# Patient Record
Sex: FEMALE | Race: WHITE
[De-identification: names, ages, dates, MRNs, and addresses within clinical notes are randomized per-mention and may not be internally consistent; named-entity substitution may affect disease eponyms.]

---

## 2019-10-08 ENCOUNTER — HOSPITAL ENCOUNTER (OUTPATIENT)
Dept: HOSPITAL 76 - LAB.WCP | Age: 64
Discharge: HOME | End: 2019-10-08
Attending: PHYSICIAN ASSISTANT
Payer: COMMERCIAL

## 2019-10-08 DIAGNOSIS — E03.9: ICD-10-CM

## 2019-10-08 DIAGNOSIS — E78.5: ICD-10-CM

## 2019-10-08 DIAGNOSIS — E11.9: ICD-10-CM

## 2019-10-08 DIAGNOSIS — I48.91: Primary | ICD-10-CM

## 2019-10-08 LAB
ALBUMIN DIAFP-MCNC: 4 G/DL (ref 3.2–5.5)
ALBUMIN/GLOB SERPL: 1.1 {RATIO} (ref 1–2.2)
ALP SERPL-CCNC: 64 IU/L (ref 42–121)
ALT SERPL W P-5'-P-CCNC: 15 IU/L (ref 10–60)
ANION GAP SERPL CALCULATED.4IONS-SCNC: 10 MMOL/L (ref 6–13)
AST SERPL W P-5'-P-CCNC: 20 IU/L (ref 10–42)
BASOPHILS NFR BLD AUTO: 0.1 10^3/UL (ref 0–0.1)
BASOPHILS NFR BLD AUTO: 1.2 %
BILIRUB BLD-MCNC: 1.1 MG/DL (ref 0.2–1)
BUN SERPL-MCNC: 29 MG/DL (ref 6–20)
CALCIUM UR-MCNC: 10.9 MG/DL (ref 8.5–10.3)
CHLORIDE SERPL-SCNC: 99 MMOL/L (ref 101–111)
CHOLEST SERPL-MCNC: 216 MG/DL
CO2 SERPL-SCNC: 29 MMOL/L (ref 21–32)
CREAT SERPLBLD-SCNC: 1.6 MG/DL (ref 0.4–1)
EOSINOPHIL # BLD AUTO: 0.2 10^3/UL (ref 0–0.7)
EOSINOPHIL NFR BLD AUTO: 2.8 %
ERYTHROCYTE [DISTWIDTH] IN BLOOD BY AUTOMATED COUNT: 13.3 % (ref 12–15)
EST. AVERAGE GLUCOSE BLD GHB EST-MCNC: 166 MG/DL (ref 70–100)
GFRSERPLBLD MDRD-ARVRAT: 32 ML/MIN/{1.73_M2} (ref 89–?)
GLOBULIN SER-MCNC: 3.5 G/DL (ref 2.1–4.2)
GLUCOSE SERPL-MCNC: 151 MG/DL (ref 70–100)
HB2 TOTAL: 14.9 G/DL
HBA1C BLD-MCNC: 0.85 G/DL
HDLC SERPL-MCNC: 59 MG/DL
HDLC SERPL: 3.7 {RATIO} (ref ?–4.4)
HEMOGLOBIN A1C %: 7.4 % (ref 4.6–6.2)
HGB UR QL STRIP: 14.1 G/DL (ref 12–16)
LDLC SERPL CALC-MCNC: 110 MG/DL
LDLC/HDLC SERPL: 1.9 {RATIO} (ref ?–4.4)
LYMPHOCYTES # SPEC AUTO: 1.6 10^3/UL (ref 1.5–3.5)
LYMPHOCYTES NFR BLD AUTO: 26.5 %
MCH RBC QN AUTO: 29.4 PG (ref 27–31)
MCHC RBC AUTO-ENTMCNC: 32.8 G/DL (ref 32–36)
MCV RBC AUTO: 89.8 FL (ref 81–99)
MONOCYTES # BLD AUTO: 0.6 10^3/UL (ref 0–1)
MONOCYTES NFR BLD AUTO: 9.5 %
NEUTROPHILS # BLD AUTO: 3.6 10^3/UL (ref 1.5–6.6)
NEUTROPHILS # SNV AUTO: 6.1 X10^3/UL (ref 4.8–10.8)
NEUTROPHILS NFR BLD AUTO: 59.5 %
PDW BLD AUTO: 10.4 FL (ref 7.9–10.8)
PLATELET # BLD: 267 10^3/UL (ref 130–450)
PROT SPEC-MCNC: 7.5 G/DL (ref 6.7–8.2)
RBC MAR: 4.79 10^6/UL (ref 4.2–5.4)
SODIUM SERPLBLD-SCNC: 138 MMOL/L (ref 135–145)
VLDLC SERPL-SCNC: 47 MG/DL

## 2019-10-08 PROCEDURE — 84443 ASSAY THYROID STIM HORMONE: CPT

## 2019-10-08 PROCEDURE — 82570 ASSAY OF URINE CREATININE: CPT

## 2019-10-08 PROCEDURE — 82043 UR ALBUMIN QUANTITATIVE: CPT

## 2019-10-08 PROCEDURE — 80061 LIPID PANEL: CPT

## 2019-10-08 PROCEDURE — 36415 COLL VENOUS BLD VENIPUNCTURE: CPT

## 2019-10-08 PROCEDURE — 85025 COMPLETE CBC W/AUTO DIFF WBC: CPT

## 2019-10-08 PROCEDURE — 83721 ASSAY OF BLOOD LIPOPROTEIN: CPT

## 2019-10-08 PROCEDURE — 80053 COMPREHEN METABOLIC PANEL: CPT

## 2019-10-08 PROCEDURE — 83036 HEMOGLOBIN GLYCOSYLATED A1C: CPT

## 2019-10-10 ENCOUNTER — HOSPITAL ENCOUNTER (OUTPATIENT)
Dept: HOSPITAL 76 - LAB.WCP | Age: 64
End: 2019-10-10
Attending: PHYSICIAN ASSISTANT
Payer: COMMERCIAL

## 2019-10-10 DIAGNOSIS — I48.91: Primary | ICD-10-CM

## 2019-10-10 DIAGNOSIS — E78.5: ICD-10-CM

## 2019-10-10 DIAGNOSIS — E11.9: ICD-10-CM

## 2019-10-10 LAB
CREAT UR-SCNC: 364.7 MG/DL
MICROALBUMIN UR-MCNC: 461.8 MG/DL (ref 0–300)
MICROALBUMIN/CREAT RATIO PNL UR: 1266.2 UG/MG (ref ?–30)

## 2019-10-10 PROCEDURE — 82570 ASSAY OF URINE CREATININE: CPT

## 2019-10-10 PROCEDURE — 82043 UR ALBUMIN QUANTITATIVE: CPT

## 2019-11-18 ENCOUNTER — HOSPITAL ENCOUNTER (OUTPATIENT)
Dept: HOSPITAL 76 - LAB.WCP | Age: 64
Discharge: HOME | End: 2019-11-18
Attending: PHYSICIAN ASSISTANT
Payer: COMMERCIAL

## 2019-11-18 DIAGNOSIS — E83.52: Primary | ICD-10-CM

## 2019-11-18 LAB
ANION GAP SERPL CALCULATED.4IONS-SCNC: 8 MMOL/L (ref 6–13)
BUN SERPL-MCNC: 35 MG/DL (ref 6–20)
CALCIUM UR-MCNC: 9.8 MG/DL (ref 8.5–10.3)
CHLORIDE SERPL-SCNC: 105 MMOL/L (ref 101–111)
CO2 SERPL-SCNC: 25 MMOL/L (ref 21–32)
CREAT SERPLBLD-SCNC: 1.3 MG/DL (ref 0.4–1)
GFRSERPLBLD MDRD-ARVRAT: 41 ML/MIN/{1.73_M2} (ref 89–?)
GLUCOSE SERPL-MCNC: 133 MG/DL (ref 70–100)
SODIUM SERPLBLD-SCNC: 138 MMOL/L (ref 135–145)

## 2019-11-18 PROCEDURE — 80048 BASIC METABOLIC PNL TOTAL CA: CPT

## 2019-11-18 PROCEDURE — 36415 COLL VENOUS BLD VENIPUNCTURE: CPT

## 2019-11-18 PROCEDURE — 83970 ASSAY OF PARATHORMONE: CPT

## 2020-01-23 ENCOUNTER — HOSPITAL ENCOUNTER (OUTPATIENT)
Dept: HOSPITAL 76 - LAB.WCP | Age: 65
Discharge: HOME | End: 2020-01-23
Attending: PHYSICIAN ASSISTANT
Payer: COMMERCIAL

## 2020-01-23 DIAGNOSIS — E11.9: Primary | ICD-10-CM

## 2020-01-23 LAB
ALBUMIN DIAFP-MCNC: 3.9 G/DL (ref 3.2–5.5)
ALBUMIN/GLOB SERPL: 1.1 {RATIO} (ref 1–2.2)
ALP SERPL-CCNC: 63 IU/L (ref 42–121)
ALT SERPL W P-5'-P-CCNC: 22 IU/L (ref 10–60)
ANION GAP SERPL CALCULATED.4IONS-SCNC: 14 MMOL/L (ref 6–13)
AST SERPL W P-5'-P-CCNC: 24 IU/L (ref 10–42)
BILIRUB BLD-MCNC: 1.1 MG/DL (ref 0.2–1)
BUN SERPL-MCNC: 28 MG/DL (ref 6–20)
CALCIUM UR-MCNC: 10.3 MG/DL (ref 8.5–10.3)
CHLORIDE SERPL-SCNC: 98 MMOL/L (ref 101–111)
CHOLEST SERPL-MCNC: 232 MG/DL
CO2 SERPL-SCNC: 26 MMOL/L (ref 21–32)
CREAT SERPLBLD-SCNC: 1.4 MG/DL (ref 0.4–1)
EST. AVERAGE GLUCOSE BLD GHB EST-MCNC: 209 MG/DL (ref 70–100)
GFRSERPLBLD MDRD-ARVRAT: 38 ML/MIN/{1.73_M2} (ref 89–?)
GLOBULIN SER-MCNC: 3.4 G/DL (ref 2.1–4.2)
GLUCOSE SERPL-MCNC: 173 MG/DL (ref 70–100)
HB2 TOTAL: 13.8 G/DL
HBA1C BLD-MCNC: 1.02 G/DL
HDLC SERPL-MCNC: 60 MG/DL
HDLC SERPL: 3.9 {RATIO} (ref ?–4.4)
HEMOGLOBIN A1C %: 8.9 % (ref 4.6–6.2)
LDLC SERPL CALC-MCNC: 116 MG/DL
LDLC/HDLC SERPL: 1.9 {RATIO} (ref ?–4.4)
PROT SPEC-MCNC: 7.3 G/DL (ref 6.7–8.2)
SODIUM SERPLBLD-SCNC: 138 MMOL/L (ref 135–145)
VLDLC SERPL-SCNC: 56 MG/DL

## 2020-01-23 PROCEDURE — 80053 COMPREHEN METABOLIC PANEL: CPT

## 2020-01-23 PROCEDURE — 83036 HEMOGLOBIN GLYCOSYLATED A1C: CPT

## 2020-01-23 PROCEDURE — 36415 COLL VENOUS BLD VENIPUNCTURE: CPT

## 2020-01-23 PROCEDURE — 80061 LIPID PANEL: CPT

## 2020-01-23 PROCEDURE — 83721 ASSAY OF BLOOD LIPOPROTEIN: CPT

## 2020-02-17 ENCOUNTER — HOSPITAL ENCOUNTER (EMERGENCY)
Dept: HOSPITAL 76 - ED | Age: 65
Discharge: HOME | End: 2020-02-17
Payer: COMMERCIAL

## 2020-02-17 VITALS — SYSTOLIC BLOOD PRESSURE: 155 MMHG | DIASTOLIC BLOOD PRESSURE: 87 MMHG

## 2020-02-17 DIAGNOSIS — E11.9: ICD-10-CM

## 2020-02-17 DIAGNOSIS — Z79.82: ICD-10-CM

## 2020-02-17 DIAGNOSIS — N83.201: ICD-10-CM

## 2020-02-17 DIAGNOSIS — Z79.899: ICD-10-CM

## 2020-02-17 DIAGNOSIS — I10: ICD-10-CM

## 2020-02-17 DIAGNOSIS — R33.9: Primary | ICD-10-CM

## 2020-02-17 DIAGNOSIS — Z79.84: ICD-10-CM

## 2020-02-17 DIAGNOSIS — N20.0: ICD-10-CM

## 2020-02-17 LAB
ALBUMIN DIAFP-MCNC: 4.4 G/DL (ref 3.2–5.5)
ALBUMIN/GLOB SERPL: 1.1 {RATIO} (ref 1–2.2)
ALP SERPL-CCNC: 68 IU/L (ref 42–121)
ALT SERPL W P-5'-P-CCNC: 22 IU/L (ref 10–60)
ANION GAP SERPL CALCULATED.4IONS-SCNC: 16 MMOL/L (ref 6–13)
AST SERPL W P-5'-P-CCNC: 23 IU/L (ref 10–42)
BASOPHILS NFR BLD AUTO: 0.1 10^3/UL (ref 0–0.1)
BASOPHILS NFR BLD AUTO: 0.7 %
BILIRUB BLD-MCNC: 0.8 MG/DL (ref 0.2–1)
BUN SERPL-MCNC: 32 MG/DL (ref 6–20)
CALCIUM UR-MCNC: 10.5 MG/DL (ref 8.5–10.3)
CHLORIDE SERPL-SCNC: 101 MMOL/L (ref 101–111)
CLARITY UR REFRACT.AUTO: CLEAR
CO2 SERPL-SCNC: 22 MMOL/L (ref 21–32)
CREAT SERPLBLD-SCNC: 1.5 MG/DL (ref 0.4–1)
EOSINOPHIL # BLD AUTO: 0.3 10^3/UL (ref 0–0.7)
EOSINOPHIL NFR BLD AUTO: 2.6 %
ERYTHROCYTE [DISTWIDTH] IN BLOOD BY AUTOMATED COUNT: 13.8 % (ref 12–15)
GFRSERPLBLD MDRD-ARVRAT: 35 ML/MIN/{1.73_M2} (ref 89–?)
GLOBULIN SER-MCNC: 3.9 G/DL (ref 2.1–4.2)
GLUCOSE SERPL-MCNC: 225 MG/DL (ref 70–100)
GLUCOSE UR QL STRIP.AUTO: 100 MG/DL
HGB UR QL STRIP: 14.2 G/DL (ref 12–16)
KETONES UR QL STRIP.AUTO: NEGATIVE MG/DL
LIPASE SERPL-CCNC: 43 U/L (ref 22–51)
LYMPHOCYTES # SPEC AUTO: 2.1 10^3/UL (ref 1.5–3.5)
LYMPHOCYTES NFR BLD AUTO: 20.8 %
MCH RBC QN AUTO: 30.8 PG (ref 27–31)
MCHC RBC AUTO-ENTMCNC: 34.1 G/DL (ref 32–36)
MCV RBC AUTO: 90.2 FL (ref 81–99)
MONOCYTES # BLD AUTO: 0.9 10^3/UL (ref 0–1)
MONOCYTES NFR BLD AUTO: 9.2 %
NEUTROPHILS # BLD AUTO: 6.6 10^3/UL (ref 1.5–6.6)
NEUTROPHILS # SNV AUTO: 10 X10^3/UL (ref 4.8–10.8)
NEUTROPHILS NFR BLD AUTO: 65.9 %
NITRITE UR QL STRIP.AUTO: NEGATIVE
PDW BLD AUTO: 9.1 FL (ref 7.9–10.8)
PH UR STRIP.AUTO: 7 PH (ref 5–7.5)
PLATELET # BLD: 284 10^3/UL (ref 130–450)
PROT SPEC-MCNC: 8.3 G/DL (ref 6.7–8.2)
PROT UR STRIP.AUTO-MCNC: 100 MG/DL
RBC # UR STRIP.AUTO: (no result) /UL
RBC MAR: 4.61 10^6/UL (ref 4.2–5.4)
SODIUM SERPLBLD-SCNC: 139 MMOL/L (ref 135–145)
SP GR UR STRIP.AUTO: 1.01 (ref 1–1.03)
SQUAMOUS URNS QL MICRO: (no result)
UROBILINOGEN UR QL STRIP.AUTO: (no result) E.U./DL
UROBILINOGEN UR STRIP.AUTO-MCNC: NEGATIVE MG/DL

## 2020-02-17 PROCEDURE — 85025 COMPLETE CBC W/AUTO DIFF WBC: CPT

## 2020-02-17 PROCEDURE — 36415 COLL VENOUS BLD VENIPUNCTURE: CPT

## 2020-02-17 PROCEDURE — 51702 INSERT TEMP BLADDER CATH: CPT

## 2020-02-17 PROCEDURE — 74176 CT ABD & PELVIS W/O CONTRAST: CPT

## 2020-02-17 PROCEDURE — 83690 ASSAY OF LIPASE: CPT

## 2020-02-17 PROCEDURE — 81001 URINALYSIS AUTO W/SCOPE: CPT

## 2020-02-17 PROCEDURE — 81003 URINALYSIS AUTO W/O SCOPE: CPT

## 2020-02-17 PROCEDURE — 87086 URINE CULTURE/COLONY COUNT: CPT

## 2020-02-17 PROCEDURE — 99284 EMERGENCY DEPT VISIT MOD MDM: CPT

## 2020-02-17 PROCEDURE — 80053 COMPREHEN METABOLIC PANEL: CPT

## 2020-02-17 NOTE — ED PHYSICIAN DOCUMENTATION
PD HPI FEMALE 





- Stated complaint


Stated Complaint: FEMALE 





- Chief complaint


Chief Complaint: Abd Pain





- History obtained from


History obtained from: Patient





- History of Present Illness


Timing - onset: Today


Timing - details: Gradual onset


Pain level max: 8


Pain level max: 0


Associated symptoms: Other (unable to urinate).  No: Fever


Similar symptoms before: Has not had sx before


Recently seen: Not recently seen





- Additional information


Additional information: 





since this afternoon, patient had increasing urge to urinate and suprapubic 

fullness and pain but unable to urinate. This gradually progressed until severe 

and thus comes to ED for evaluation





Review of Systems


Constitutional: denies: Fever, Chills, Sweats


Cardiac: reports: Reviewed and negative


Respiratory: reports: Reviewed and negative


GI: reports: Abdominal Pain (suprapubic pain, pressure).  denies: Nausea, 

Vomiting


: reports: Unable to Void.  denies: Dysuria, Frequency





PD PAST MEDICAL HISTORY





- Past Medical History


Past Medical History: Yes


Cardiovascular: Hypertension, High cholesterol


Respiratory: Sleep apnea, CPAP use


Endocrine/Autoimmune: Type 2 diabetes


Psych: None





- Past Surgical History


Ortho: Other





- Present Medications


Home Medications: 


                                Ambulatory Orders











 Medication  Instructions  Recorded  Confirmed


 


Amlodipine Besylate 10 mg PO DAILY 03/13/15 03/17/15


 


Aspirin [Aspir 81] 81 mg PO DAILY 03/13/15 03/13/15


 


Dapagliflozin Propanediol [Farxiga] 5 mg PO DAILY 03/13/15 03/17/15


 


Levothyroxine [Synthroid] 125 mcg PO QDAC 03/13/15 03/17/15


 


Losartan Potassium 100 mg PO DAILY 03/13/15 03/17/15


 


Metformin HCl 500 mg PO BID 03/13/15 03/17/15


 


Simvastatin 20 mg PO DAILY 03/13/15 03/17/15


 


Triamterene/Hydrochlorothiazid 1 tab PO DAILY 03/13/15 03/17/15





[Maxzide 37.5 mg-25 mg Tablet]   














- Allergies


Allergies/Adverse Reactions: 


                                    Allergies











Allergy/AdvReac Type Severity Reaction Status Date / Time


 


codeine AdvReac Intermediate Lightheaded Verified 02/17/20 18:30


 


prednisone AdvReac Intermediate Rash Verified 02/17/20 18:30


 


Fluoroquinalones AdvReac Severe Avoid due Uncoded 02/17/20 18:30





   to  





   ruptured  





   achillies  





   tendon  














- Social History


Does the pt smoke?: No


Smoking Status: Never smoker





- Immunizations


Immunizations are current?: Yes





- POLST


Patient has POLST: No





PD ED PE NORMAL





- Vitals


Vital signs reviewed: Yes





- General


General: Alert and oriented X 3, No acute distress ((note that my evaluation is 

subsequent to placement of gallo catheter by RN which resulted in near-

resolution of discomfort)), Well developed/nourished





- Cardiac


Cardiac: RRR





- Respiratory


Respiratory: No respiratory distress, Clear bilaterally





- Abdomen


Abdomen: Soft, Non tender, Non distended





- Back


Back: No CVA TTP





- Derm


Derm: Normal color, Warm and dry, No rash





Results





- Vitals


Vitals: 


                               Vital Signs - 24 hr











  02/17/20 02/17/20 02/17/20





  18:30 20:29 22:34


 


Temperature 36.5 C  


 


Heart Rate 64 68 59 L


 


Respiratory 14 16 14





Rate   


 


Blood Pressure 173/100 H 175/84 H 145/85 H


 


O2 Saturation 98 100 97














  02/17/20





  23:16


 


Temperature 36.9 C


 


Heart Rate 58 L


 


Respiratory 16





Rate 


 


Blood Pressure 155/87 H


 


O2 Saturation 98








                                     Oxygen











O2 Source                      Room air

















- Labs


Labs: 


                                Laboratory Tests











  02/17/20 02/17/20 02/17/20





  18:50 18:50 19:28


 


WBC  10.0  


 


RBC  4.61  


 


Hgb  14.2  


 


Hct  41.6  


 


MCV  90.2  


 


MCH  30.8  


 


MCHC  34.1  


 


RDW  13.8  


 


Plt Count  284  


 


MPV  9.1  


 


Neut # (Auto)  6.6  


 


Lymph # (Auto)  2.1  


 


Mono # (Auto)  0.9  


 


Eos # (Auto)  0.3  


 


Baso # (Auto)  0.1  


 


Absolute Nucleated RBC  0.00  


 


Nucleated RBC %  0.0  


 


Sodium   139 


 


Potassium   3.4 L 


 


Chloride   101 


 


Carbon Dioxide   22 


 


Anion Gap   16.0 H 


 


BUN   32 H 


 


Creatinine   1.5 H 


 


Estimated GFR (MDRD)   35 L 


 


Glucose   225 H 


 


Calcium   10.5 H 


 


Total Bilirubin   0.8 


 


AST   23 


 


ALT   22 


 


Alkaline Phosphatase   68 


 


Total Protein   8.3 H 


 


Albumin   4.4 


 


Globulin   3.9 


 


Albumin/Globulin Ratio   1.1 


 


Lipase   43 


 


Urine Color    YELLOW


 


Urine Clarity    CLEAR


 


Urine pH    7.0


 


Ur Specific Gravity    1.015


 


Urine Protein    100 H


 


Urine Glucose (UA)    100 H


 


Urine Ketones    NEGATIVE


 


Urine Occult Blood    MODERATE H


 


Urine Nitrite    NEGATIVE


 


Urine Bilirubin    NEGATIVE


 


Urine Urobilinogen    0.2 (NORMAL)


 


Ur Leukocyte Esterase    NEGATIVE


 


Urine RBC    11-25 H


 


Urine WBC    0-3


 


Ur Squamous Epith Cells    NONE SEEN


 


Urine Bacteria    None Seen


 


Ur Microscopic Review    INDICATED


 


Urine Culture Comments    NOT INDICATED














- Rads (name of study)


  ** CT A/P


Radiology: Prelim report reviewed, See rad report





PD MEDICAL DECISION MAKING





- ED course


Complexity details: reviewed results, re-evaluated patient, considered 

differential, d/w patient





Departure





- Departure


Disposition: 01 Home, Self Care


Clinical Impression: 


 Urinary retention, Ovarian cyst





Condition: Good


Instructions:  ED Cyst Ovarian, ED Retention Urinary Female


Follow-Up: 


Natalie Shaw PA-C [Primary Care Provider] - 


Comments: 


Beside following up with your primary care provider, you might be referred to 

other specialists such as urology and ob/gyn. You might need more testing 

regarding the cause of your urinary retention as well as the ovarian cyst seen 

on tonight's CT scan.


Discharge Date/Time: 02/17/20 23:16

## 2020-02-17 NOTE — CT REPORT
Reason:  abdominal pain, urinary retention

Procedure Date:  02/17/2020   

Accession Number:  [id number] / [id number]                    

Procedure:  CT  - Abdomen/Pelvis WO CPT Code:  

 

***Final Report***

 

 

FULL RESULT:

 

 

EXAM:

CT ABDOMEN AND PELVIS (CT KUB)

 

EXAM DATE: 2/17/2020 09:52 PM.

 

CLINICAL HISTORY: Right-sided pain. Urinary retention.

 

COMPARISONS: ABD/PEL 03/25/2010 3:31 AM.

 

TECHNIQUE: Routine axial helical CT imaging was performed through the 

abdomen and pelvis without IV contrast. Reconstructions: Coronal and 

sagittal.

 

In accordance with CT protocol optimization, one or more of the following 

dose reduction techniques were utilized for this exam: automated exposure 

control, adjustment of mA and/or KV based on patient size, or use of 

iterative reconstructive technique.

 

FINDINGS:

Lung Bases: Unremarkable.

 

Right Kidney/Ureter: No stones, hydronephrosis, or hydroureter. 

Perinephric fat stranding.

 

Left Kidney/Ureter: Nonobstructing 1 mm lower pole intrarenal stone. No 

ureteral stones, hydronephrosis, or hydroureter. Perinephric fat 

stranding. Upper pole 1.7 cm and 2.2 cm cysts.

 

Other Solid Organs: Noncontrast images of the solid organs are grossly 

unremarkable.

 

Gallbladder/Bile Ducts: Unremarkable.

 

Peritoneal Cavity: No free fluid, free air or shahid adenopathy. Bowel is 

grossly unremarkable.

 

Pelvic Organs: 6.1 x 6.7 x 6.2 cm right ovarian cyst noted. Otherwise the 

reproductive organs are unremarkable. Nolen catheter drains the bladder.

 

Vasculature: Unremarkable.

 

Other: Fat-containing umbilical hernia 4.6 cm across with a neck of 1.4 

cm.

IMPRESSION:

1. Nonobstructing 1 mm intrarenal stone on the left, otherwise no stones 

and no obstruction.

2. Right ovarian 6.1 x 6.7 x 6.2 cm cyst.

 

[facility]

## 2020-03-18 ENCOUNTER — HOSPITAL ENCOUNTER (OUTPATIENT)
Dept: HOSPITAL 76 - LAB.WCP | Age: 65
Discharge: HOME | End: 2020-03-18
Attending: PHYSICIAN ASSISTANT
Payer: COMMERCIAL

## 2020-03-18 DIAGNOSIS — E11.9: Primary | ICD-10-CM

## 2020-03-18 LAB
ALBUMIN DIAFP-MCNC: 4 G/DL (ref 3.2–5.5)
ALBUMIN/GLOB SERPL: 1.1 {RATIO} (ref 1–2.2)
ALP SERPL-CCNC: 63 IU/L (ref 42–121)
ALT SERPL W P-5'-P-CCNC: 16 IU/L (ref 10–60)
ANION GAP SERPL CALCULATED.4IONS-SCNC: 10 MMOL/L (ref 6–13)
AST SERPL W P-5'-P-CCNC: 19 IU/L (ref 10–42)
BILIRUB BLD-MCNC: 0.7 MG/DL (ref 0.2–1)
BUN SERPL-MCNC: 24 MG/DL (ref 6–20)
CALCIUM UR-MCNC: 9.7 MG/DL (ref 8.5–10.3)
CHLORIDE SERPL-SCNC: 105 MMOL/L (ref 101–111)
CHOLEST SERPL-MCNC: 195 MG/DL
CO2 SERPL-SCNC: 23 MMOL/L (ref 21–32)
CREAT SERPLBLD-SCNC: 1.2 MG/DL (ref 0.4–1)
EST. AVERAGE GLUCOSE BLD GHB EST-MCNC: 189 MG/DL (ref 70–100)
GLOBULIN SER-MCNC: 3.5 G/DL (ref 2.1–4.2)
GLUCOSE SERPL-MCNC: 125 MG/DL (ref 70–100)
HB2 TOTAL: 13.6 G/DL
HBA1C BLD-MCNC: 0.9 G/DL
HDLC SERPL-MCNC: 54 MG/DL
HDLC SERPL: 3.6 {RATIO} (ref ?–4.4)
HEMOGLOBIN A1C %: 8.2 % (ref 4.6–6.2)
LDLC SERPL CALC-MCNC: 100 MG/DL
LDLC/HDLC SERPL: 1.9 {RATIO} (ref ?–4.4)
PROT SPEC-MCNC: 7.5 G/DL (ref 6.7–8.2)
SODIUM SERPLBLD-SCNC: 138 MMOL/L (ref 135–145)
VLDLC SERPL-SCNC: 41 MG/DL

## 2020-03-18 PROCEDURE — 36415 COLL VENOUS BLD VENIPUNCTURE: CPT

## 2020-03-18 PROCEDURE — 80053 COMPREHEN METABOLIC PANEL: CPT

## 2020-03-18 PROCEDURE — 83721 ASSAY OF BLOOD LIPOPROTEIN: CPT

## 2020-03-18 PROCEDURE — 80061 LIPID PANEL: CPT

## 2020-03-18 PROCEDURE — 83036 HEMOGLOBIN GLYCOSYLATED A1C: CPT

## 2020-05-22 ENCOUNTER — HOSPITAL ENCOUNTER (OUTPATIENT)
Dept: HOSPITAL 76 - LAB.WCP | Age: 65
Discharge: HOME | End: 2020-05-22
Attending: PHYSICIAN ASSISTANT
Payer: COMMERCIAL

## 2020-05-22 DIAGNOSIS — E11.9: Primary | ICD-10-CM

## 2020-05-22 LAB
ANION GAP SERPL CALCULATED.4IONS-SCNC: 11 MMOL/L (ref 6–13)
BUN SERPL-MCNC: 32 MG/DL (ref 6–20)
CALCIUM UR-MCNC: 10.4 MG/DL (ref 8.5–10.3)
CHLORIDE SERPL-SCNC: 101 MMOL/L (ref 101–111)
CHOLEST SERPL-MCNC: 183 MG/DL
CO2 SERPL-SCNC: 24 MMOL/L (ref 21–32)
CREAT SERPLBLD-SCNC: 1.4 MG/DL (ref 0.4–1)
EST. AVERAGE GLUCOSE BLD GHB EST-MCNC: 229 MG/DL (ref 70–100)
GLUCOSE SERPL-MCNC: 214 MG/DL (ref 70–100)
HB2 TOTAL: 14.3 G/DL
HBA1C BLD-MCNC: 1.16 G/DL
HDLC SERPL-MCNC: 58 MG/DL
HDLC SERPL: 3.2 {RATIO} (ref ?–4.4)
HEMOGLOBIN A1C %: 9.6 % (ref 4.6–6.2)
LDLC SERPL CALC-MCNC: 73 MG/DL
LDLC/HDLC SERPL: 1.3 {RATIO} (ref ?–4.4)
SODIUM SERPLBLD-SCNC: 136 MMOL/L (ref 135–145)
VLDLC SERPL-SCNC: 52 MG/DL

## 2020-05-22 PROCEDURE — 80061 LIPID PANEL: CPT

## 2020-05-22 PROCEDURE — 83036 HEMOGLOBIN GLYCOSYLATED A1C: CPT

## 2020-05-22 PROCEDURE — 83721 ASSAY OF BLOOD LIPOPROTEIN: CPT

## 2020-05-22 PROCEDURE — 80048 BASIC METABOLIC PNL TOTAL CA: CPT

## 2020-05-22 PROCEDURE — 36415 COLL VENOUS BLD VENIPUNCTURE: CPT

## 2020-06-22 ENCOUNTER — HOSPITAL ENCOUNTER (OUTPATIENT)
Dept: HOSPITAL 76 - LAB.WCP | Age: 65
Discharge: HOME | End: 2020-06-22
Attending: PHYSICIAN ASSISTANT
Payer: COMMERCIAL

## 2020-06-22 DIAGNOSIS — N83.201: ICD-10-CM

## 2020-06-22 DIAGNOSIS — N18.9: Primary | ICD-10-CM

## 2020-06-22 LAB
ANION GAP SERPL CALCULATED.4IONS-SCNC: 13 MMOL/L (ref 6–13)
BUN SERPL-MCNC: 33 MG/DL (ref 6–20)
CALCIUM UR-MCNC: 10 MG/DL (ref 8.5–10.3)
CHLORIDE SERPL-SCNC: 97 MMOL/L (ref 101–111)
CO2 SERPL-SCNC: 24 MMOL/L (ref 21–32)
CREAT SERPLBLD-SCNC: 1.9 MG/DL (ref 0.4–1)
GLUCOSE SERPL-MCNC: 299 MG/DL (ref 70–100)
SODIUM SERPLBLD-SCNC: 134 MMOL/L (ref 135–145)

## 2020-06-22 PROCEDURE — 80048 BASIC METABOLIC PNL TOTAL CA: CPT

## 2020-06-22 PROCEDURE — 86304 IMMUNOASSAY TUMOR CA 125: CPT

## 2020-06-22 PROCEDURE — 36415 COLL VENOUS BLD VENIPUNCTURE: CPT

## 2020-08-06 ENCOUNTER — HOSPITAL ENCOUNTER (OUTPATIENT)
Dept: HOSPITAL 76 - LAB.WCP | Age: 65
Discharge: HOME | End: 2020-08-06
Attending: PHYSICIAN ASSISTANT
Payer: COMMERCIAL

## 2020-08-06 DIAGNOSIS — E11.22: Primary | ICD-10-CM

## 2020-08-06 DIAGNOSIS — N18.9: ICD-10-CM

## 2020-08-06 DIAGNOSIS — D70.9: ICD-10-CM

## 2020-08-06 DIAGNOSIS — D63.1: ICD-10-CM

## 2020-08-06 DIAGNOSIS — N05.9: ICD-10-CM

## 2020-08-06 DIAGNOSIS — R80.9: ICD-10-CM

## 2020-08-06 LAB
ANION GAP SERPL CALCULATED.4IONS-SCNC: 12 MMOL/L (ref 6–13)
BUN SERPL-MCNC: 28 MG/DL (ref 6–20)
CALCIUM UR-MCNC: 10.5 MG/DL (ref 8.5–10.3)
CHLORIDE SERPL-SCNC: 102 MMOL/L (ref 101–111)
CO2 SERPL-SCNC: 23 MMOL/L (ref 21–32)
CREAT SERPLBLD-SCNC: 1.7 MG/DL (ref 0.4–1)
CREAT UR-SCNC: 150.3 MG/DL
ERYTHROCYTE [DISTWIDTH] IN BLOOD BY AUTOMATED COUNT: 13.5 % (ref 12–15)
EST. AVERAGE GLUCOSE BLD GHB EST-MCNC: 212 MG/DL (ref 70–100)
GLUCOSE SERPL-MCNC: 180 MG/DL (ref 70–100)
HB2 TOTAL: 15.4 G/DL
HBA1C BLD-MCNC: 1.15 G/DL
HEMOGLOBIN A1C %: 9 % (ref 4.6–6.2)
HGB UR QL STRIP: 14.2 G/DL (ref 12–16)
MCH RBC QN AUTO: 30.9 PG (ref 27–31)
MCHC RBC AUTO-ENTMCNC: 33.7 G/DL (ref 32–36)
MCV RBC AUTO: 91.7 FL (ref 81–99)
NEUTROPHILS # SNV AUTO: 5.7 X10^3/UL (ref 4.8–10.8)
PDW BLD AUTO: 10.2 FL (ref 7.9–10.8)
PLATELET # BLD: 237 10^3/UL (ref 130–450)
RBC MAR: 4.59 10^6/UL (ref 4.2–5.4)
SODIUM SERPLBLD-SCNC: 137 MMOL/L (ref 135–145)

## 2020-08-06 PROCEDURE — 82570 ASSAY OF URINE CREATININE: CPT

## 2020-08-06 PROCEDURE — 36415 COLL VENOUS BLD VENIPUNCTURE: CPT

## 2020-08-06 PROCEDURE — 80048 BASIC METABOLIC PNL TOTAL CA: CPT

## 2020-08-06 PROCEDURE — 83036 HEMOGLOBIN GLYCOSYLATED A1C: CPT

## 2020-08-06 PROCEDURE — 84156 ASSAY OF PROTEIN URINE: CPT

## 2020-08-06 PROCEDURE — 82043 UR ALBUMIN QUANTITATIVE: CPT

## 2020-08-06 PROCEDURE — 85027 COMPLETE CBC AUTOMATED: CPT

## 2020-08-08 LAB — PROT 24H UR-MCNC: 73 MG/DL

## 2020-08-13 ENCOUNTER — HOSPITAL ENCOUNTER (OUTPATIENT)
Dept: HOSPITAL 76 - DI | Age: 65
Discharge: HOME | End: 2020-08-13
Attending: PHYSICIAN ASSISTANT
Payer: COMMERCIAL

## 2020-08-13 ENCOUNTER — HOSPITAL ENCOUNTER (OUTPATIENT)
Dept: HOSPITAL 76 - DI.WCP | Age: 65
Discharge: HOME | End: 2020-08-13
Attending: PHYSICIAN ASSISTANT
Payer: COMMERCIAL

## 2020-08-13 ENCOUNTER — HOSPITAL ENCOUNTER (OUTPATIENT)
Dept: HOSPITAL 76 - LAB.WCP | Age: 65
Discharge: HOME | End: 2020-08-13
Attending: PHYSICIAN ASSISTANT
Payer: COMMERCIAL

## 2020-08-13 DIAGNOSIS — I51.7: ICD-10-CM

## 2020-08-13 DIAGNOSIS — R06.09: Primary | ICD-10-CM

## 2020-08-13 PROCEDURE — 71046 X-RAY EXAM CHEST 2 VIEWS: CPT

## 2020-08-13 PROCEDURE — 85379 FIBRIN DEGRADATION QUANT: CPT

## 2020-08-13 PROCEDURE — 36415 COLL VENOUS BLD VENIPUNCTURE: CPT

## 2020-08-14 NOTE — XRAY REPORT
PROCEDURE:  Chest 2 View X-Ray

 

INDICATIONS:  DYSPNEA ON EXERTION

 

TECHNIQUE:  2 view(s) of the chest.  

 

COMPARISON:  None.

 

FINDINGS:  

 

Surgical changes and devices: Median sternotomy changes. 3-lead left chest wall cardiac pacing device
..  

 

Lungs and pleura:  No pleural effusions or pneumothorax.  Lungs are clear.  

 

Mediastinum:  Mediastinal contours are normal.  Heart size is at the upper limits of normal.

 

Bones and chest wall:  No suspicious bony abnormalities.  Soft tissues appear unremarkable.  

 

IMPRESSION:  

 

Borderline cardiomegaly with increased interstitial markings in the perihilar lungs, consistent with 
an element of cardiogenic pulmonary edema.

 

Asymmetric right hilar fullness could be related to pulmonary vascular congestion, although a hilar m
ass could cause a similar appearance. CT chest with IV contrast is recommended.

 

Reviewed by: Chetan Daniels MD on 8/13/2020 11:55 AM PDT

Approved by: Chetan Daniels MD on 8/13/2020 11:55 AM PDT

 

 

Station ID:  [id number]

## 2020-10-16 ENCOUNTER — HOSPITAL ENCOUNTER (OUTPATIENT)
Dept: HOSPITAL 76 - LAB.WCP | Age: 65
Discharge: HOME | End: 2020-10-16
Attending: INTERNAL MEDICINE
Payer: COMMERCIAL

## 2020-10-16 DIAGNOSIS — I50.32: ICD-10-CM

## 2020-10-16 DIAGNOSIS — N25.81: ICD-10-CM

## 2020-10-16 DIAGNOSIS — E83.30: ICD-10-CM

## 2020-10-16 DIAGNOSIS — D64.9: ICD-10-CM

## 2020-10-16 DIAGNOSIS — N05.9: Primary | ICD-10-CM

## 2020-10-16 LAB
ANION GAP SERPL CALCULATED.4IONS-SCNC: 11 MMOL/L (ref 6–13)
BUN SERPL-MCNC: 21 MG/DL (ref 6–20)
CALCIUM UR-MCNC: 9.8 MG/DL (ref 8.5–10.3)
CHLORIDE SERPL-SCNC: 101 MMOL/L (ref 101–111)
CO2 SERPL-SCNC: 26 MMOL/L (ref 21–32)
CREAT SERPLBLD-SCNC: 1.4 MG/DL (ref 0.4–1)
GLUCOSE SERPL-MCNC: 189 MG/DL (ref 70–100)
PHOSPHATE BLD-MCNC: 3.6 MG/DL (ref 2.5–4.6)
SODIUM SERPLBLD-SCNC: 138 MMOL/L (ref 135–145)
VIT B12 SERPL-MCNC: 1223 PG/ML (ref 180–914)

## 2020-10-16 PROCEDURE — 83880 ASSAY OF NATRIURETIC PEPTIDE: CPT

## 2020-10-16 PROCEDURE — 83970 ASSAY OF PARATHORMONE: CPT

## 2020-10-16 PROCEDURE — 36415 COLL VENOUS BLD VENIPUNCTURE: CPT

## 2020-10-16 PROCEDURE — 82607 VITAMIN B-12: CPT

## 2020-10-16 PROCEDURE — 84100 ASSAY OF PHOSPHORUS: CPT

## 2020-10-16 PROCEDURE — 80048 BASIC METABOLIC PNL TOTAL CA: CPT

## 2020-11-09 ENCOUNTER — HOSPITAL ENCOUNTER (OUTPATIENT)
Dept: HOSPITAL 76 - LAB.WCP | Age: 65
Discharge: HOME | End: 2020-11-09
Attending: PHYSICIAN ASSISTANT
Payer: COMMERCIAL

## 2020-11-09 DIAGNOSIS — E11.9: Primary | ICD-10-CM

## 2020-11-09 LAB
ALBUMIN DIAFP-MCNC: 3.8 G/DL (ref 3.2–5.5)
ALBUMIN/GLOB SERPL: 1 {RATIO} (ref 1–2.2)
ALP SERPL-CCNC: 78 IU/L (ref 42–121)
ALT SERPL W P-5'-P-CCNC: 19 IU/L (ref 10–60)
ANION GAP SERPL CALCULATED.4IONS-SCNC: 8 MMOL/L (ref 6–13)
AST SERPL W P-5'-P-CCNC: 20 IU/L (ref 10–42)
BILIRUB BLD-MCNC: 0.9 MG/DL (ref 0.2–1)
BUN SERPL-MCNC: 20 MG/DL (ref 6–20)
CALCIUM UR-MCNC: 9.5 MG/DL (ref 8.5–10.3)
CHLORIDE SERPL-SCNC: 101 MMOL/L (ref 101–111)
CHOLEST SERPL-MCNC: 136 MG/DL
CO2 SERPL-SCNC: 26 MMOL/L (ref 21–32)
CREAT SERPLBLD-SCNC: 1.2 MG/DL (ref 0.4–1)
GLOBULIN SER-MCNC: 3.9 G/DL (ref 2.1–4.2)
GLUCOSE SERPL-MCNC: 172 MG/DL (ref 70–100)
HBA1C MFR BLD HPLC: 8.3 % (ref 4.27–6.07)
HDLC SERPL-MCNC: 53 MG/DL
HDLC SERPL: 2.6 {RATIO} (ref ?–4.4)
LDLC SERPL CALC-MCNC: 49 MG/DL
LDLC/HDLC SERPL: 0.9 {RATIO} (ref ?–4.4)
PROT SPEC-MCNC: 7.7 G/DL (ref 6.7–8.2)
SODIUM SERPLBLD-SCNC: 135 MMOL/L (ref 135–145)
VLDLC SERPL-SCNC: 34 MG/DL

## 2020-11-09 PROCEDURE — 36415 COLL VENOUS BLD VENIPUNCTURE: CPT

## 2020-11-09 PROCEDURE — 80053 COMPREHEN METABOLIC PANEL: CPT

## 2020-11-09 PROCEDURE — 83036 HEMOGLOBIN GLYCOSYLATED A1C: CPT

## 2020-11-09 PROCEDURE — 83721 ASSAY OF BLOOD LIPOPROTEIN: CPT

## 2020-11-09 PROCEDURE — 80061 LIPID PANEL: CPT

## 2020-12-10 ENCOUNTER — HOSPITAL ENCOUNTER (OUTPATIENT)
Dept: HOSPITAL 76 - DI | Age: 65
Discharge: HOME | End: 2020-12-10
Attending: PHYSICIAN ASSISTANT
Payer: COMMERCIAL

## 2020-12-10 DIAGNOSIS — Z78.0: Primary | ICD-10-CM

## 2020-12-10 NOTE — DEXA REPORT
PROCEDURE: Dexa Spine and/or Hip

 

INDICATIONS: POST MENOPAUSAL

 

TECHNIQUE: Dual energy x-ray absorptiometry (DXA) was performed on a ikeG[employer] System.  Regions measur
ed are the AP Spine, femoral neck, and if needed forearm.

 

COMPARISON: None.

  

FINDINGS:

 

Lumbar Spine: 

Bone Mineral Density   1.317 g/cm/cm,T score  1.1, normal

 

Left Hip:

Bone Mineral Density  1.084 g/cm/cm,T score 0.6, normal

 

Left Femoral Neck:

Bone Mineral Density  1.055 g/cm/cm, T score 0.1, normal

 

 

(T score greater or equal to -1.0: NORMAL)

(T score from -1.1 to -2.4: OSTEOPENIA)

(T score less than or equal to -2.5 to: OSTEOPOROSIS)

 

 

Impression: Normal bone mineral density.

 

 

Patients with diagnosis of osteoporosis or osteopenia should have regular bone mineral density assess
ment.  For those eligible for Medicare, routine testing is allowed once every 2 years.  Testing frequ
ency can be increased for patients who have rapidly progressing disease or for those who are receivin
g medical therapy to restore bone mass.

 

Reviewed by: Jailyn Grant MD, PhD on 12/10/2020 4:29 PM PST

Approved by: Jailyn Grant MD, PhD on 12/10/2020 4:29 PM PST

 

 

Station ID:  [id number]

## 2021-02-22 ENCOUNTER — HOSPITAL ENCOUNTER (OUTPATIENT)
Dept: HOSPITAL 76 - LAB.WCP | Age: 66
Discharge: HOME | End: 2021-02-22
Attending: PHYSICIAN ASSISTANT
Payer: COMMERCIAL

## 2021-02-22 DIAGNOSIS — E11.9: Primary | ICD-10-CM

## 2021-02-22 LAB
ANION GAP SERPL CALCULATED.4IONS-SCNC: 13 MMOL/L (ref 6–13)
BUN SERPL-MCNC: 19 MG/DL (ref 6–20)
CALCIUM UR-MCNC: 10.4 MG/DL (ref 8.5–10.3)
CHLORIDE SERPL-SCNC: 98 MMOL/L (ref 101–111)
CO2 SERPL-SCNC: 27 MMOL/L (ref 21–32)
CREAT SERPLBLD-SCNC: 1.2 MG/DL (ref 0.4–1)
GLUCOSE SERPL-MCNC: 234 MG/DL (ref 70–100)
HBA1C MFR BLD HPLC: 9.5 % (ref 4.27–6.07)

## 2021-02-22 PROCEDURE — 80048 BASIC METABOLIC PNL TOTAL CA: CPT

## 2021-02-22 PROCEDURE — 36415 COLL VENOUS BLD VENIPUNCTURE: CPT

## 2021-02-22 PROCEDURE — 83036 HEMOGLOBIN GLYCOSYLATED A1C: CPT

## 2021-04-19 ENCOUNTER — HOSPITAL ENCOUNTER (OUTPATIENT)
Dept: HOSPITAL 76 - LAB.N | Age: 66
Discharge: HOME | End: 2021-04-19
Attending: INTERNAL MEDICINE
Payer: COMMERCIAL

## 2021-04-19 DIAGNOSIS — N05.9: Primary | ICD-10-CM

## 2021-04-19 DIAGNOSIS — I50.32: ICD-10-CM

## 2021-04-19 LAB
ANION GAP SERPL CALCULATED.4IONS-SCNC: 12 MMOL/L (ref 6–13)
BUN SERPL-MCNC: 21 MG/DL (ref 6–20)
CALCIUM UR-MCNC: 10.9 MG/DL (ref 8.5–10.3)
CHLORIDE SERPL-SCNC: 105 MMOL/L (ref 101–111)
CO2 SERPL-SCNC: 24 MMOL/L (ref 21–32)
CREAT SERPLBLD-SCNC: 1.4 MG/DL (ref 0.4–1)
GFRSERPLBLD MDRD-ARVRAT: 37 ML/MIN/{1.73_M2} (ref 89–?)
GLUCOSE SERPL-MCNC: 196 MG/DL (ref 70–100)
POTASSIUM SERPL-SCNC: 3.9 MMOL/L (ref 3.5–5)
SODIUM SERPLBLD-SCNC: 141 MMOL/L (ref 135–145)

## 2021-04-19 PROCEDURE — 36415 COLL VENOUS BLD VENIPUNCTURE: CPT

## 2021-04-19 PROCEDURE — 83880 ASSAY OF NATRIURETIC PEPTIDE: CPT

## 2021-04-19 PROCEDURE — 80048 BASIC METABOLIC PNL TOTAL CA: CPT

## 2021-07-05 ENCOUNTER — HOSPITAL ENCOUNTER (OUTPATIENT)
Dept: HOSPITAL 76 - COV | Age: 66
Discharge: HOME | End: 2021-07-05
Attending: INTERNAL MEDICINE
Payer: MEDICARE

## 2021-07-05 DIAGNOSIS — R59.0: ICD-10-CM

## 2021-07-05 DIAGNOSIS — Z20.822: ICD-10-CM

## 2021-07-05 DIAGNOSIS — Z01.812: Primary | ICD-10-CM

## 2021-07-09 ENCOUNTER — HOSPITAL ENCOUNTER (OUTPATIENT)
Dept: HOSPITAL 76 - LAB.N | Age: 66
Discharge: HOME | End: 2021-07-09
Attending: PHYSICIAN ASSISTANT
Payer: MEDICARE

## 2021-07-09 DIAGNOSIS — E11.9: Primary | ICD-10-CM

## 2021-07-09 LAB
ALBUMIN DIAFP-MCNC: 4.1 G/DL (ref 3.2–5.5)
ALBUMIN/GLOB SERPL: 1.1 {RATIO} (ref 1–2.2)
ALP SERPL-CCNC: 66 IU/L (ref 42–121)
ALT SERPL W P-5'-P-CCNC: 19 IU/L (ref 10–60)
ANION GAP SERPL CALCULATED.4IONS-SCNC: 13 MMOL/L (ref 6–13)
AST SERPL W P-5'-P-CCNC: 23 IU/L (ref 10–42)
BILIRUB BLD-MCNC: 0.9 MG/DL (ref 0.2–1)
BUN SERPL-MCNC: 30 MG/DL (ref 6–20)
CALCIUM UR-MCNC: 9.6 MG/DL (ref 8.5–10.3)
CHLORIDE SERPL-SCNC: 98 MMOL/L (ref 101–111)
CHOLEST SERPL-MCNC: 148 MG/DL
CO2 SERPL-SCNC: 26 MMOL/L (ref 21–32)
CREAT SERPLBLD-SCNC: 1.5 MG/DL (ref 0.4–1)
EST. AVERAGE GLUCOSE BLD GHB EST-MCNC: 197 MG/DL (ref 70–100)
GFRSERPLBLD MDRD-ARVRAT: 35 ML/MIN/{1.73_M2} (ref 89–?)
GLOBULIN SER-MCNC: 3.7 G/DL (ref 2.1–4.2)
GLUCOSE SERPL-MCNC: 341 MG/DL (ref 70–100)
HBA1C MFR BLD HPLC: 8.5 % (ref 4.27–6.07)
HDLC SERPL-MCNC: 60 MG/DL
HDLC SERPL: 2.5 {RATIO} (ref ?–4.4)
LDLC SERPL CALC-MCNC: 64 MG/DL
LDLC/HDLC SERPL: 1.1 {RATIO} (ref ?–4.4)
POTASSIUM SERPL-SCNC: 3.8 MMOL/L (ref 3.5–5)
PROT SPEC-MCNC: 7.8 G/DL (ref 6.7–8.2)
SODIUM SERPLBLD-SCNC: 137 MMOL/L (ref 135–145)
TRIGL P FAST SERPL-MCNC: 118 MG/DL
VLDLC SERPL-SCNC: 24 MG/DL

## 2021-07-09 PROCEDURE — 36415 COLL VENOUS BLD VENIPUNCTURE: CPT

## 2021-07-09 PROCEDURE — 80061 LIPID PANEL: CPT

## 2021-07-09 PROCEDURE — 83721 ASSAY OF BLOOD LIPOPROTEIN: CPT

## 2021-07-09 PROCEDURE — 80053 COMPREHEN METABOLIC PANEL: CPT

## 2021-07-09 PROCEDURE — 83036 HEMOGLOBIN GLYCOSYLATED A1C: CPT

## 2021-10-08 ENCOUNTER — HOSPITAL ENCOUNTER (OUTPATIENT)
Dept: HOSPITAL 76 - LAB.N | Age: 66
Discharge: HOME | End: 2021-10-08
Attending: PHYSICIAN ASSISTANT
Payer: MEDICARE

## 2021-10-08 DIAGNOSIS — E11.9: Primary | ICD-10-CM

## 2021-10-08 LAB
ANION GAP SERPL CALCULATED.4IONS-SCNC: 14 MMOL/L (ref 6–13)
BUN SERPL-MCNC: 22 MG/DL (ref 6–20)
CALCIUM UR-MCNC: 10.7 MG/DL (ref 8.5–10.3)
CHLORIDE SERPL-SCNC: 103 MMOL/L (ref 101–111)
CO2 SERPL-SCNC: 24 MMOL/L (ref 21–32)
CREAT SERPLBLD-SCNC: 1.4 MG/DL (ref 0.4–1)
EST. AVERAGE GLUCOSE BLD GHB EST-MCNC: 217 MG/DL (ref 70–100)
GFRSERPLBLD MDRD-ARVRAT: 37 ML/MIN/{1.73_M2} (ref 89–?)
GLUCOSE SERPL-MCNC: 174 MG/DL (ref 70–100)
HBA1C MFR BLD HPLC: 9.2 % (ref 4.27–6.07)
POTASSIUM SERPL-SCNC: 3.8 MMOL/L (ref 3.5–5)
SODIUM SERPLBLD-SCNC: 141 MMOL/L (ref 135–145)

## 2021-10-08 PROCEDURE — 83036 HEMOGLOBIN GLYCOSYLATED A1C: CPT

## 2021-10-08 PROCEDURE — 36415 COLL VENOUS BLD VENIPUNCTURE: CPT

## 2021-10-08 PROCEDURE — 80048 BASIC METABOLIC PNL TOTAL CA: CPT

## 2021-10-09 ENCOUNTER — HOSPITAL ENCOUNTER (EMERGENCY)
Dept: HOSPITAL 76 - ED | Age: 66
Discharge: HOME | End: 2021-10-09
Payer: MEDICARE

## 2021-10-09 VITALS — DIASTOLIC BLOOD PRESSURE: 87 MMHG | SYSTOLIC BLOOD PRESSURE: 210 MMHG

## 2021-10-09 DIAGNOSIS — Z79.84: ICD-10-CM

## 2021-10-09 DIAGNOSIS — R33.9: Primary | ICD-10-CM

## 2021-10-09 DIAGNOSIS — E11.9: ICD-10-CM

## 2021-10-09 DIAGNOSIS — I10: ICD-10-CM

## 2021-10-09 LAB
CLARITY UR REFRACT.AUTO: CLEAR
GLUCOSE UR QL STRIP.AUTO: 250 MG/DL
KETONES UR QL STRIP.AUTO: NEGATIVE MG/DL
NITRITE UR QL STRIP.AUTO: NEGATIVE
PH UR STRIP.AUTO: 6.5 PH (ref 5–7.5)
PROT UR STRIP.AUTO-MCNC: 100 MG/DL
RBC # UR STRIP.AUTO: (no result) /UL
RBC # URNS HPF: (no result) /HPF (ref 0–5)
SP GR UR STRIP.AUTO: 1.02 (ref 1–1.03)
SQUAMOUS URNS QL MICRO: (no result)
UROBILINOGEN UR QL STRIP.AUTO: (no result) E.U./DL
UROBILINOGEN UR STRIP.AUTO-MCNC: NEGATIVE MG/DL
WBC # UR MANUAL: (no result) /HPF (ref 0–5)

## 2021-10-09 PROCEDURE — 87086 URINE CULTURE/COLONY COUNT: CPT

## 2021-10-09 PROCEDURE — 81003 URINALYSIS AUTO W/O SCOPE: CPT

## 2021-10-09 PROCEDURE — 81001 URINALYSIS AUTO W/SCOPE: CPT

## 2021-10-09 PROCEDURE — 99282 EMERGENCY DEPT VISIT SF MDM: CPT

## 2021-10-09 PROCEDURE — 51702 INSERT TEMP BLADDER CATH: CPT

## 2021-10-09 PROCEDURE — 99283 EMERGENCY DEPT VISIT LOW MDM: CPT

## 2021-10-09 NOTE — ED PHYSICIAN DOCUMENTATION
PD HPI FEMALE 





- Stated complaint


Stated Complaint: FEMALE /PX





- Chief complaint


Chief Complaint: Abd Pain





- History obtained from


History obtained from: Patient





- History of Present Illness


Timing - onset: Today (this morning)


Timing - details: Gradual onset


Pain level max: 6


Associated symptoms: Other (unable to urinate).  No: Fever


Similar symptoms before: Other (T+R for same from this ED 2/17/20. patient says 

she was seen in follow up by ob/gyn (right pelvic cyst noted on CT in ED, but 

patient says she was subsequently told this was benign and she also had recent 

US that showed the cyst was decreasing in size).)





- Additional information


Additional information: 





patient c/o unable to urinate since this morning, c/o urge to urinate with 

suprapubic pain and fullness. Similar episode February 2020. Patient says she 

saw urology in f/u and was taken off of a medication that was suspected to have 

caused the retention (can't remember the medication, but it had been prescribed 

to prevent urinary frequency). However, she is no longer on this, or any 

similar, medication. She had not had another episode of urinary retention since 

the previous ED visit in 2020





Review of Systems


Constitutional: reports: Reviewed and negative


GI: reports: Abdominal Pain (suprapubic pain/fullness).  denies: Nausea, 

Vomiting


: reports: Unable to Void


Musculoskeletal: denies: Back pain





PD PAST MEDICAL HISTORY





- Past Medical History


Cardiovascular: Hypertension, High cholesterol


Respiratory: Sleep apnea, CPAP use


Endocrine/Autoimmune: Type 2 diabetes


Psych: None





- Past Surgical History


Ortho: Other





- Present Medications


Home Medications: 


                                Ambulatory Orders











 Medication  Instructions  Recorded  Confirmed


 


Amlodipine Besylate 10 mg PO DAILY 03/13/15 03/17/15


 


Aspirin [Aspir 81] 81 mg PO DAILY 03/13/15 03/13/15


 


Dapagliflozin Propanediol [Farxiga] 5 mg PO DAILY 03/13/15 03/17/15


 


Levothyroxine [Synthroid] 125 mcg PO QDAC 03/13/15 03/17/15


 


Losartan Potassium 100 mg PO DAILY 03/13/15 03/17/15


 


Metformin HCl 500 mg PO BID 03/13/15 03/17/15


 


Simvastatin 20 mg PO DAILY 03/13/15 03/17/15


 


Triamterene/Hydrochlorothiazid 1 tab PO DAILY 03/13/15 03/17/15





[Maxzide 37.5 mg-25 mg Tablet]   














- Allergies


Allergies/Adverse Reactions: 


                                    Allergies











Allergy/AdvReac Type Severity Reaction Status Date / Time


 


ciprofloxacin [From Cipro] AdvReac Severe Ruptured Verified 10/09/21 18:29





   Achilles  


 


levofloxacin AdvReac Severe Ruptured Verified 10/09/21 18:29





   Achilles  


 


moxifloxacin AdvReac Severe Ruptured Verified 10/09/21 18:29





   Achilles  


 


codeine AdvReac Intermediate Lightheaded Verified 10/09/21 18:29


 


prednisone AdvReac Intermediate Rash Verified 10/09/21 18:29














- Social History


Does the pt smoke?: No


Smoking Status: Never smoker





- Immunizations


Immunizations are current?: Yes





- POLST


Patient has POLST: No





PD ED PE NORMAL





- Vitals


Vital signs reviewed: Yes





- General


General: Alert and oriented X 3, Other (obese female appears to be in obvious 

painful discomfort)





- Cardiac


Cardiac: RRR





- Respiratory


Respiratory: No respiratory distress, Clear bilaterally





- Abdomen


Abdomen: Soft, Other (suprapubic fullness and TTP)





- Back


Back: No CVA TTP





Results





- Vitals


Vitals: 


                                     Oxygen











O2 Source                      Room air

















- Labs


Labs: 


                                Laboratory Tests











  10/09/21





  21:49


 


Urine Color  YELLOW


 


Urine Clarity  CLEAR


 


Urine pH  6.5


 


Ur Specific Gravity  1.020


 


Urine Protein  100 H


 


Urine Glucose (UA)  250 H


 


Urine Ketones  NEGATIVE


 


Urine Occult Blood  MODERATE H


 


Urine Nitrite  NEGATIVE


 


Urine Bilirubin  NEGATIVE


 


Urine Urobilinogen  0.2 (NORMAL)


 


Ur Leukocyte Esterase  NEGATIVE


 


Urine RBC  TNTC H


 


Urine WBC  4-5


 


Ur Squamous Epith Cells  RARE Squamous


 


Urine Bacteria  Few


 


Ur Microscopic Review  INDICATED


 


Urine Culture Comments  NOT INDICATED














PD MEDICAL DECISION MAKING





- ED course


Complexity details: reviewed old records, re-evaluated patient, considered 

differential, d/w patient


ED course: 





gallo catheter placed by ED RN with over 600 cc clear yellow urine output and 

resolution of symptoms (suprapubic pain, urge to urinate). We discussed option 

of keeping gallo in place vs removal prior to discharge from ED. She prefers to 

keep it in place, has appointment this coming Tuesday with PMD





Departure





- Departure


Disposition: 01 Home, Self Care


Clinical Impression: 


 Urinary retention





Condition: Good


Instructions:  ED Catheter Care Gallo, ED Retention Urinary Female


Follow-Up: 


Natalie Shaw PA-C [Primary Care Provider] -  (Follow up this Tuesday as 

scheduled)


Discharge Date/Time: 10/09/21 23:01

## 2021-10-11 ENCOUNTER — HOSPITAL ENCOUNTER (EMERGENCY)
Dept: HOSPITAL 76 - ED | Age: 66
Discharge: HOME | End: 2021-10-11
Payer: MEDICARE

## 2021-10-11 VITALS — DIASTOLIC BLOOD PRESSURE: 102 MMHG | SYSTOLIC BLOOD PRESSURE: 176 MMHG

## 2021-10-11 DIAGNOSIS — I10: ICD-10-CM

## 2021-10-11 DIAGNOSIS — Z79.82: ICD-10-CM

## 2021-10-11 DIAGNOSIS — Z79.84: ICD-10-CM

## 2021-10-11 DIAGNOSIS — R33.9: Primary | ICD-10-CM

## 2021-10-11 DIAGNOSIS — T83.9XXA: ICD-10-CM

## 2021-10-11 DIAGNOSIS — E11.9: ICD-10-CM
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
## 2021-10-11 PROCEDURE — 51702 INSERT TEMP BLADDER CATH: CPT

## 2021-10-11 PROCEDURE — 99283 EMERGENCY DEPT VISIT LOW MDM: CPT

## 2021-10-11 PROCEDURE — 99282 EMERGENCY DEPT VISIT SF MDM: CPT

## 2021-10-11 NOTE — ED PHYSICIAN DOCUMENTATION
PD HPI FEMALE 





- Stated complaint


Stated Complaint: FEMALE 





- Chief complaint


Chief Complaint: Abd Pain





- History obtained from


History obtained from: Patient





- Additional information


Additional information: 





Seen here 2 days ago for urinary retention and had gallo placed. Since 8am today

onle a small amount of urine in the bag, but not filling up at all. Assoc with 

severe suprapubic pressure.


RN notes 500ml in the bladder.





Review of Systems


Constitutional: reports: Reviewed and negative


Ears: reports: Reviewed and negative


Nose: reports: Reviewed and negative


Throat: reports: Reviewed and negative





PD PAST MEDICAL HISTORY





- Past Medical History


Cardiovascular: Hypertension, High cholesterol


Respiratory: Sleep apnea, CPAP use


Endocrine/Autoimmune: Type 2 diabetes


Psych: None





- Past Surgical History


Past Surgical History: Yes


Ortho: Other





- Present Medications


Home Medications: 


                                Ambulatory Orders











 Medication  Instructions  Recorded  Confirmed


 


Amlodipine Besylate 10 mg PO DAILY 03/13/15 03/17/15


 


Aspirin [Aspir 81] 81 mg PO DAILY 03/13/15 03/13/15


 


Dapagliflozin Propanediol [Farxiga] 5 mg PO DAILY 03/13/15 03/17/15


 


Levothyroxine [Synthroid] 125 mcg PO QDAC 03/13/15 03/17/15


 


Losartan Potassium 100 mg PO DAILY 03/13/15 03/17/15


 


Metformin HCl 500 mg PO BID 03/13/15 03/17/15


 


Simvastatin 20 mg PO DAILY 03/13/15 03/17/15


 


Triamterene/Hydrochlorothiazid 1 tab PO DAILY 03/13/15 03/17/15





[Maxzide 37.5 mg-25 mg Tablet]   














- Allergies


Allergies/Adverse Reactions: 


                                    Allergies











Allergy/AdvReac Type Severity Reaction Status Date / Time


 


ciprofloxacin [From Cipro] AdvReac Severe Ruptured Verified 10/11/21 13:34





   Achilles  


 


levofloxacin AdvReac Severe Ruptured Verified 10/11/21 13:34





   Achilles  


 


moxifloxacin AdvReac Severe Ruptured Verified 10/11/21 13:34





   Achilles  


 


codeine AdvReac Intermediate Lightheaded Verified 10/11/21 13:34


 


prednisone AdvReac Intermediate Rash Verified 10/11/21 13:34














- Social History


Does the pt smoke?: No


Smoking Status: Never smoker





- Immunizations


Immunizations are current?: Yes





- POLST


Patient has POLST: No





PD ED PE NORMAL





- Vitals


Vital signs reviewed: Yes





- General


General: Alert and oriented X 3, No acute distress





- Neuro


Neuro: Alert and oriented X 3, Normal speech





- Psych


Psych: Normal mood, Normal affect





Results





- Vitals


Vitals: 


                               Vital Signs - 24 hr











  10/11/21





  13:34


 


Temperature 36.2 C L


 


Heart Rate 61


 


Respiratory 22





Rate 


 


Blood Pressure 147/119 H


 


O2 Saturation 98








                                     Oxygen











O2 Source                      Room air

















PD MEDICAL DECISION MAKING





- ED course


ED course: 





66-year-old woman with urinary retention presents 2 days after having a catheter

placed with sensation of urinary retention and decreased urine in the bag.  The 

catheter was flushed and it started working again, but she requested the 

catheter be removed so without draining too much urine the catheter was removed 

and she was able to urinate but still had a significant postvoid residual 

necessitating replacement of the catheter.





Departure





- Departure


Disposition: 01 Home, Self Care


Clinical Impression: 


 Urinary retention





Condition: Good


Record reviewed to determine appropriate education?: Yes


Instructions:  ED Catheter Care Tamanna


Comments: 


Follow-up with your primary care's physician as scheduled.  At this point it 

seems reasonable to have them refer you to urology for further evaluation and 

treatment.  Return for new or worsening symptoms.

## 2021-10-29 ENCOUNTER — HOSPITAL ENCOUNTER (OUTPATIENT)
Dept: HOSPITAL 76 - LAB.N | Age: 66
Discharge: HOME | End: 2021-10-29
Attending: INTERNAL MEDICINE
Payer: MEDICARE

## 2021-10-29 DIAGNOSIS — D86.9: Primary | ICD-10-CM

## 2021-10-29 LAB
ALBUMIN DIAFP-MCNC: 3.6 G/DL (ref 3.2–5.5)
ALP SERPL-CCNC: 75 IU/L (ref 42–121)
ALT SERPL W P-5'-P-CCNC: 30 IU/L (ref 10–60)
ANION GAP SERPL CALCULATED.4IONS-SCNC: 13 MMOL/L (ref 6–13)
AST SERPL W P-5'-P-CCNC: 24 IU/L (ref 10–42)
BASOPHILS NFR BLD AUTO: 0.1 10^3/UL (ref 0–0.1)
BASOPHILS NFR BLD AUTO: 0.5 %
BILIRUB BLD-MCNC: 0.7 MG/DL (ref 0.2–1)
BILIRUB DIRECT SERPL-MCNC: 0.3 MG/DL (ref 0.1–0.5)
BUN SERPL-MCNC: 30 MG/DL (ref 6–20)
CALCIUM UR-MCNC: 9 MG/DL (ref 8.5–10.3)
CHLORIDE SERPL-SCNC: 101 MMOL/L (ref 101–111)
CO2 SERPL-SCNC: 17 MMOL/L (ref 21–32)
CREAT SERPLBLD-SCNC: 1.4 MG/DL (ref 0.4–1)
EOSINOPHIL # BLD AUTO: 0 10^3/UL (ref 0–0.7)
EOSINOPHIL NFR BLD AUTO: 0.3 %
ERYTHROCYTE [DISTWIDTH] IN BLOOD BY AUTOMATED COUNT: 14.3 % (ref 12–15)
GFRSERPLBLD MDRD-ARVRAT: 37 ML/MIN/{1.73_M2} (ref 89–?)
GLOBULIN SER-MCNC: 3.6 G/DL (ref 2.1–4.2)
GLUCOSE SERPL-MCNC: 346 MG/DL (ref 70–100)
HCT VFR BLD AUTO: 43.3 % (ref 37–47)
HGB UR QL STRIP: 14.2 G/DL (ref 12–16)
LYMPHOCYTES # SPEC AUTO: 0.7 10^3/UL (ref 1.5–3.5)
LYMPHOCYTES NFR BLD AUTO: 6.4 %
MCH RBC QN AUTO: 30.1 PG (ref 27–31)
MCHC RBC AUTO-ENTMCNC: 32.8 G/DL (ref 32–36)
MCV RBC AUTO: 91.9 FL (ref 81–99)
MONOCYTES # BLD AUTO: 0.4 10^3/UL (ref 0–1)
MONOCYTES NFR BLD AUTO: 3.3 %
NEUTROPHILS # BLD AUTO: 10.1 10^3/UL (ref 1.5–6.6)
NEUTROPHILS # SNV AUTO: 11.4 X10^3/UL (ref 4.8–10.8)
NEUTROPHILS NFR BLD AUTO: 88.6 %
NRBC # BLD AUTO: 0 /100WBC
NRBC # BLD AUTO: 0 X10^3/UL
PDW BLD AUTO: 11.3 FL (ref 7.9–10.8)
PLATELET # BLD: 185 10^3/UL (ref 130–450)
POTASSIUM SERPL-SCNC: 4.7 MMOL/L (ref 3.5–5)
PROT SPEC-MCNC: 7.2 G/DL (ref 6.7–8.2)
RBC MAR: 4.71 10^6/UL (ref 4.2–5.4)
SODIUM SERPLBLD-SCNC: 131 MMOL/L (ref 135–145)

## 2021-10-29 PROCEDURE — 80048 BASIC METABOLIC PNL TOTAL CA: CPT

## 2021-10-29 PROCEDURE — 85025 COMPLETE CBC W/AUTO DIFF WBC: CPT

## 2021-10-29 PROCEDURE — 36415 COLL VENOUS BLD VENIPUNCTURE: CPT

## 2021-10-29 PROCEDURE — 80076 HEPATIC FUNCTION PANEL: CPT

## 2021-11-29 ENCOUNTER — HOSPITAL ENCOUNTER (OUTPATIENT)
Dept: HOSPITAL 76 - LAB.N | Age: 66
Discharge: HOME | End: 2021-11-29
Attending: INTERNAL MEDICINE
Payer: MEDICARE

## 2021-11-29 DIAGNOSIS — D86.9: Primary | ICD-10-CM

## 2021-11-29 LAB
BASOPHILS NFR BLD AUTO: 0.1 10^3/UL (ref 0–0.1)
BASOPHILS NFR BLD AUTO: 0.4 %
EOSINOPHIL # BLD AUTO: 0 10^3/UL (ref 0–0.7)
EOSINOPHIL NFR BLD AUTO: 0.2 %
ERYTHROCYTE [DISTWIDTH] IN BLOOD BY AUTOMATED COUNT: 16 % (ref 12–15)
HCT VFR BLD AUTO: 42.1 % (ref 37–47)
HGB UR QL STRIP: 14.1 G/DL (ref 12–16)
LYMPHOCYTES # SPEC AUTO: 0.9 10^3/UL (ref 1.5–3.5)
LYMPHOCYTES NFR BLD AUTO: 7.6 %
MCH RBC QN AUTO: 31.6 PG (ref 27–31)
MCHC RBC AUTO-ENTMCNC: 33.5 G/DL (ref 32–36)
MCV RBC AUTO: 94.4 FL (ref 81–99)
MONOCYTES # BLD AUTO: 0.2 10^3/UL (ref 0–1)
MONOCYTES NFR BLD AUTO: 1.9 %
NEUTROPHILS # BLD AUTO: 10.9 10^3/UL (ref 1.5–6.6)
NEUTROPHILS # SNV AUTO: 12.2 X10^3/UL (ref 4.8–10.8)
NEUTROPHILS NFR BLD AUTO: 89.3 %
NRBC # BLD AUTO: 0 /100WBC
NRBC # BLD AUTO: 0 X10^3/UL
PDW BLD AUTO: 11.1 FL (ref 7.9–10.8)
PLATELET # BLD: 226 10^3/UL (ref 130–450)
RBC MAR: 4.46 10^6/UL (ref 4.2–5.4)

## 2021-11-29 PROCEDURE — 36415 COLL VENOUS BLD VENIPUNCTURE: CPT

## 2021-11-29 PROCEDURE — 85025 COMPLETE CBC W/AUTO DIFF WBC: CPT

## 2021-12-24 ENCOUNTER — HOSPITAL ENCOUNTER (OUTPATIENT)
Dept: HOSPITAL 76 - LAB.N | Age: 66
Discharge: HOME | End: 2021-12-24
Payer: MEDICARE

## 2021-12-24 DIAGNOSIS — D86.9: Primary | ICD-10-CM

## 2021-12-24 LAB
ALBUMIN DIAFP-MCNC: 3.8 G/DL (ref 3.2–5.5)
ALP SERPL-CCNC: 71 IU/L (ref 42–121)
ALT SERPL W P-5'-P-CCNC: 78 IU/L (ref 10–60)
AST SERPL W P-5'-P-CCNC: 42 IU/L (ref 10–42)
BASOPHILS NFR BLD AUTO: 0.1 10^3/UL (ref 0–0.1)
BASOPHILS NFR BLD AUTO: 0.5 %
BILIRUB BLD-MCNC: 1 MG/DL (ref 0.2–1)
BILIRUB DIRECT SERPL-MCNC: 0.1 MG/DL (ref 0.1–0.5)
EOSINOPHIL # BLD AUTO: 0.1 10^3/UL (ref 0–0.7)
EOSINOPHIL NFR BLD AUTO: 0.5 %
ERYTHROCYTE [DISTWIDTH] IN BLOOD BY AUTOMATED COUNT: 17.1 % (ref 12–15)
GLOBULIN SER-MCNC: 2.9 G/DL (ref 2.1–4.2)
HCT VFR BLD AUTO: 41 % (ref 37–47)
HGB UR QL STRIP: 13.6 G/DL (ref 12–16)
LYMPHOCYTES # SPEC AUTO: 1.8 10^3/UL (ref 1.5–3.5)
LYMPHOCYTES NFR BLD AUTO: 16.4 %
MCH RBC QN AUTO: 31.6 PG (ref 27–31)
MCHC RBC AUTO-ENTMCNC: 33.2 G/DL (ref 32–36)
MCV RBC AUTO: 95.3 FL (ref 81–99)
MONOCYTES # BLD AUTO: 1.1 10^3/UL (ref 0–1)
MONOCYTES NFR BLD AUTO: 9.8 %
NEUTROPHILS # BLD AUTO: 8 10^3/UL (ref 1.5–6.6)
NEUTROPHILS # SNV AUTO: 11.1 X10^3/UL (ref 4.8–10.8)
NEUTROPHILS NFR BLD AUTO: 71.6 %
NRBC # BLD AUTO: 0 /100WBC
NRBC # BLD AUTO: 0 X10^3/UL
PDW BLD AUTO: 11.2 FL (ref 7.9–10.8)
PLATELET # BLD: 221 10^3/UL (ref 130–450)
PROT SPEC-MCNC: 6.7 G/DL (ref 6.7–8.2)
RBC MAR: 4.3 10^6/UL (ref 4.2–5.4)

## 2021-12-24 PROCEDURE — 85025 COMPLETE CBC W/AUTO DIFF WBC: CPT

## 2021-12-24 PROCEDURE — 80076 HEPATIC FUNCTION PANEL: CPT

## 2021-12-24 PROCEDURE — 36415 COLL VENOUS BLD VENIPUNCTURE: CPT

## 2022-01-28 ENCOUNTER — HOSPITAL ENCOUNTER (OUTPATIENT)
Dept: HOSPITAL 76 - LAB.N | Age: 67
Discharge: HOME | End: 2022-01-28
Attending: INTERNAL MEDICINE
Payer: MEDICARE

## 2022-01-28 DIAGNOSIS — D86.9: Primary | ICD-10-CM

## 2022-01-28 LAB
ALBUMIN DIAFP-MCNC: 4.1 G/DL (ref 3.2–5.5)
ALP SERPL-CCNC: 75 IU/L (ref 42–121)
ALT SERPL W P-5'-P-CCNC: 51 IU/L (ref 10–60)
AST SERPL W P-5'-P-CCNC: 36 IU/L (ref 10–42)
BASOPHILS NFR BLD AUTO: 0 10^3/UL (ref 0–0.1)
BASOPHILS NFR BLD AUTO: 0.3 %
BILIRUB BLD-MCNC: 0.8 MG/DL (ref 0.2–1)
BILIRUB DIRECT SERPL-MCNC: 0.2 MG/DL (ref 0.1–0.5)
EOSINOPHIL # BLD AUTO: 0 10^3/UL (ref 0–0.7)
EOSINOPHIL NFR BLD AUTO: 0.2 %
ERYTHROCYTE [DISTWIDTH] IN BLOOD BY AUTOMATED COUNT: 15.7 % (ref 12–15)
GLOBULIN SER-MCNC: 3.3 G/DL (ref 2.1–4.2)
HCT VFR BLD AUTO: 43.6 % (ref 37–47)
HGB UR QL STRIP: 14.7 G/DL (ref 12–16)
LYMPHOCYTES # SPEC AUTO: 1.1 10^3/UL (ref 1.5–3.5)
LYMPHOCYTES NFR BLD AUTO: 8.9 %
MCH RBC QN AUTO: 32.7 PG (ref 27–31)
MCHC RBC AUTO-ENTMCNC: 33.7 G/DL (ref 32–36)
MCV RBC AUTO: 97.1 FL (ref 81–99)
MONOCYTES # BLD AUTO: 0.5 10^3/UL (ref 0–1)
MONOCYTES NFR BLD AUTO: 4.3 %
NEUTROPHILS # BLD AUTO: 10.3 10^3/UL (ref 1.5–6.6)
NEUTROPHILS # SNV AUTO: 12.1 X10^3/UL (ref 4.8–10.8)
NEUTROPHILS NFR BLD AUTO: 85.1 %
NRBC # BLD AUTO: 0 /100WBC
NRBC # BLD AUTO: 0 X10^3/UL
PDW BLD AUTO: 10.2 FL (ref 7.9–10.8)
PLATELET # BLD: 251 10^3/UL (ref 130–450)
PROT SPEC-MCNC: 7.4 G/DL (ref 6.7–8.2)
RBC MAR: 4.49 10^6/UL (ref 4.2–5.4)

## 2022-01-28 PROCEDURE — 80076 HEPATIC FUNCTION PANEL: CPT

## 2022-01-28 PROCEDURE — 36415 COLL VENOUS BLD VENIPUNCTURE: CPT

## 2022-01-28 PROCEDURE — 85025 COMPLETE CBC W/AUTO DIFF WBC: CPT

## 2022-03-04 ENCOUNTER — HOSPITAL ENCOUNTER (OUTPATIENT)
Dept: HOSPITAL 76 - LAB.N | Age: 67
Discharge: HOME | End: 2022-03-04
Payer: MEDICARE

## 2022-03-04 DIAGNOSIS — D86.9: Primary | ICD-10-CM

## 2022-03-04 LAB
ALBUMIN DIAFP-MCNC: 3.9 G/DL (ref 3.2–5.5)
ALP SERPL-CCNC: 76 IU/L (ref 42–121)
ALT SERPL W P-5'-P-CCNC: 37 IU/L (ref 10–60)
ANION GAP SERPL CALCULATED.4IONS-SCNC: 13 MMOL/L (ref 6–13)
AST SERPL W P-5'-P-CCNC: 29 IU/L (ref 10–42)
BASOPHILS NFR BLD AUTO: 0.1 10^3/UL (ref 0–0.1)
BASOPHILS NFR BLD AUTO: 0.4 %
BILIRUB BLD-MCNC: 1 MG/DL (ref 0.2–1)
BILIRUB DIRECT SERPL-MCNC: 0.1 MG/DL (ref 0.1–0.5)
BUN SERPL-MCNC: 34 MG/DL (ref 6–20)
CALCIUM UR-MCNC: 9.3 MG/DL (ref 8.5–10.3)
CHLORIDE SERPL-SCNC: 97 MMOL/L (ref 101–111)
CO2 SERPL-SCNC: 23 MMOL/L (ref 21–32)
CREAT SERPLBLD-SCNC: 1.6 MG/DL (ref 0.4–1)
EOSINOPHIL # BLD AUTO: 0 10^3/UL (ref 0–0.7)
EOSINOPHIL NFR BLD AUTO: 0.2 %
ERYTHROCYTE [DISTWIDTH] IN BLOOD BY AUTOMATED COUNT: 14.3 % (ref 12–15)
GFRSERPLBLD MDRD-ARVRAT: 32 ML/MIN/{1.73_M2} (ref 89–?)
GLOBULIN SER-MCNC: 3 G/DL (ref 2.1–4.2)
GLUCOSE SERPL-MCNC: 349 MG/DL (ref 70–100)
HCT VFR BLD AUTO: 42.3 % (ref 37–47)
HGB UR QL STRIP: 14.2 G/DL (ref 12–16)
LYMPHOCYTES # SPEC AUTO: 1.2 10^3/UL (ref 1.5–3.5)
LYMPHOCYTES NFR BLD AUTO: 9.5 %
MCH RBC QN AUTO: 32.9 PG (ref 27–31)
MCHC RBC AUTO-ENTMCNC: 33.6 G/DL (ref 32–36)
MCV RBC AUTO: 97.9 FL (ref 81–99)
MONOCYTES # BLD AUTO: 0.5 10^3/UL (ref 0–1)
MONOCYTES NFR BLD AUTO: 4.3 %
NEUTROPHILS # BLD AUTO: 10.4 10^3/UL (ref 1.5–6.6)
NEUTROPHILS # SNV AUTO: 12.3 X10^3/UL (ref 4.8–10.8)
NEUTROPHILS NFR BLD AUTO: 84.7 %
NRBC # BLD AUTO: 0 /100WBC
NRBC # BLD AUTO: 0 X10^3/UL
PDW BLD AUTO: 10.1 FL (ref 7.9–10.8)
PLATELET # BLD: 246 10^3/UL (ref 130–450)
POTASSIUM SERPL-SCNC: 3.7 MMOL/L (ref 3.5–5)
PROT SPEC-MCNC: 6.9 G/DL (ref 6.7–8.2)
RBC MAR: 4.32 10^6/UL (ref 4.2–5.4)
SODIUM SERPLBLD-SCNC: 133 MMOL/L (ref 135–145)

## 2022-03-04 PROCEDURE — 85025 COMPLETE CBC W/AUTO DIFF WBC: CPT

## 2022-03-04 PROCEDURE — 80048 BASIC METABOLIC PNL TOTAL CA: CPT

## 2022-03-04 PROCEDURE — 36415 COLL VENOUS BLD VENIPUNCTURE: CPT

## 2022-03-04 PROCEDURE — 80076 HEPATIC FUNCTION PANEL: CPT

## 2022-04-08 ENCOUNTER — HOSPITAL ENCOUNTER (OUTPATIENT)
Dept: HOSPITAL 76 - LAB.N | Age: 67
Discharge: HOME | End: 2022-04-08
Attending: PHYSICIAN ASSISTANT
Payer: MEDICARE

## 2022-04-08 DIAGNOSIS — E11.9: Primary | ICD-10-CM

## 2022-04-08 LAB
ALBUMIN DIAFP-MCNC: 3.7 G/DL (ref 3.2–5.5)
ALBUMIN/GLOB SERPL: 1.3 {RATIO} (ref 1–2.2)
ALP SERPL-CCNC: 61 IU/L (ref 42–121)
ALT SERPL W P-5'-P-CCNC: 37 IU/L (ref 10–60)
ANION GAP SERPL CALCULATED.4IONS-SCNC: 12 MMOL/L (ref 6–13)
AST SERPL W P-5'-P-CCNC: 27 IU/L (ref 10–42)
BILIRUB BLD-MCNC: 0.7 MG/DL (ref 0.2–1)
BUN SERPL-MCNC: 25 MG/DL (ref 6–20)
CALCIUM UR-MCNC: 9.1 MG/DL (ref 8.5–10.3)
CHLORIDE SERPL-SCNC: 102 MMOL/L (ref 101–111)
CHOLEST SERPL-MCNC: 167 MG/DL
CO2 SERPL-SCNC: 26 MMOL/L (ref 21–32)
CREAT SERPLBLD-SCNC: 1.8 MG/DL (ref 0.4–1)
CREAT UR-SCNC: 128.7 MG/DL
EST. AVERAGE GLUCOSE BLD GHB EST-MCNC: 180 MG/DL (ref 70–100)
GFRSERPLBLD MDRD-ARVRAT: 28 ML/MIN/{1.73_M2} (ref 89–?)
GLOBULIN SER-MCNC: 2.9 G/DL (ref 2.1–4.2)
GLUCOSE SERPL-MCNC: 129 MG/DL (ref 70–100)
HBA1C MFR BLD HPLC: 7.9 % (ref 4.27–6.07)
HDLC SERPL-MCNC: 69 MG/DL
HDLC SERPL: 2.4 {RATIO} (ref ?–4.4)
LDLC SERPL CALC-MCNC: 44 MG/DL
LDLC/HDLC SERPL: 0.6 {RATIO} (ref ?–4.4)
MICROALBUMIN UR-MCNC: 26.1 MG/DL (ref 0–300)
MICROALBUMIN/CREAT RATIO PNL UR: 202.8 UG/MG (ref ?–30)
POTASSIUM SERPL-SCNC: 3 MMOL/L (ref 3.5–5)
PROT SPEC-MCNC: 6.6 G/DL (ref 6.7–8.2)
SODIUM SERPLBLD-SCNC: 140 MMOL/L (ref 135–145)
TRIGL P FAST SERPL-MCNC: 271 MG/DL
VLDLC SERPL-SCNC: 54 MG/DL

## 2022-04-08 PROCEDURE — 80061 LIPID PANEL: CPT

## 2022-04-08 PROCEDURE — 83721 ASSAY OF BLOOD LIPOPROTEIN: CPT

## 2022-04-08 PROCEDURE — 82043 UR ALBUMIN QUANTITATIVE: CPT

## 2022-04-08 PROCEDURE — 80053 COMPREHEN METABOLIC PANEL: CPT

## 2022-04-08 PROCEDURE — 82570 ASSAY OF URINE CREATININE: CPT

## 2022-04-08 PROCEDURE — 36415 COLL VENOUS BLD VENIPUNCTURE: CPT

## 2022-04-08 PROCEDURE — 83036 HEMOGLOBIN GLYCOSYLATED A1C: CPT

## 2022-04-11 ENCOUNTER — HOSPITAL ENCOUNTER (OUTPATIENT)
Dept: HOSPITAL 76 - SC | Age: 67
Discharge: HOME | End: 2022-04-11
Attending: INTERNAL MEDICINE
Payer: MEDICARE

## 2022-04-11 VITALS — SYSTOLIC BLOOD PRESSURE: 132 MMHG | DIASTOLIC BLOOD PRESSURE: 75 MMHG

## 2022-04-11 DIAGNOSIS — G47.33: Primary | ICD-10-CM

## 2022-04-11 DIAGNOSIS — E66.01: ICD-10-CM

## 2022-04-11 PROCEDURE — 99212 OFFICE O/P EST SF 10 MIN: CPT

## 2022-04-11 PROCEDURE — 99202 OFFICE O/P NEW SF 15 MIN: CPT

## 2022-04-11 NOTE — SLEEP CARE CONSULTATION
Information from patient questionnaire entered by Daniela Anthony MA.





I have reviewed and concur with the information entered by Daniela Anthony MA. 

This document represents the service I personally performed and the decisions 

made by me, Roe Lay MD, [facility].





History of Present Illness


Service Date and Time: 04/11/2022    1044


Reason for Visit: New patient (LAST SEEN 2019, ), Other (ON CPAP CURRENTLY)


Prior sleep studies: Yes


Year and Where: [facility] 2019


Additional HPI information: 


Ms. Plascencia returned today for follow up of nasal CPAP therapy after last seen 3 

years ago.  She was diagnosed to have severe obstructive sleep apnea-hypopnea 

syndrome.  The patient got her original CPAP in [place] (I prescribed her a 

device here but she left for [place] before she could get it).  Recently, she 

received the replacement DreamStation 1 from [employer].  She wears a 

[employer] DreamWear nasal cushion mask.  She continues to use the device 

nightly and all through the night. The compliance download (her online data are 

not available to us) shows usage in 180 nights out of the past 180 nights, 

averaging 8.7 hours a night.  She complained of no particular problem with the 

device such as soreness on the face, dry nose, epistaxis, nasal congestion or 

headache.  She thinks that the pressure of 10 - 15 cmH2O is occasionally too 

high during the night.  On the CPAP therapy she notices improvement in her sleep

quality, and that she wakes up feeling fresher in the morning and more 

awake/alert during the day.  The [place] Sleepiness Scale score 6.  Her  

notices occasional snore.  The average residual AHI is 0.7; and air leak, 36 

seconds.  The 90th percentile pressure is 11 cmH2O.








Subjective


Initial [place] Sleepiness Scale score: 7 (2022)





Past Medical History


Past Medical History: reports: Hypertension, Diabetes, Arrythmia (with 

pacemaker), Hypothyroidism, Other (sarcoidosis)





Social History


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 Patient is  and lives in [place]. 





Have you smoked in the past 12 months: No


Alcohol use: No


Caffeine use: Yes


Caffeine amount and frequency: 1 X DAILY





Family History


Family Hx Sleep Apnea: Mother: Snoring (No sleep-disordered breathing in the 

family.), Father: Snoring, Sibling: Snoring





Allergies and Home Medications


Known drug allergies: Yes


Drug allergies reviewed: Yes


Home medication list reviewed: Yes


Allergy and home medication list: 


Allergies





ciprofloxacin [From Cipro] Adverse Reaction (Severe, Verified 10/11/21 13:34)


   Ruptured Achilles


levofloxacin Adverse Reaction (Severe, Verified 10/11/21 13:34)


   Ruptured Achilles


moxifloxacin Adverse Reaction (Severe, Verified 10/11/21 13:34)


   Ruptured Achilles


codeine Adverse Reaction (Intermediate, Verified 10/11/21 13:34)


   Lightheaded


prednisone Adverse Reaction (Intermediate, Verified 10/11/21 13:34)


   Rash











Review of Systems


Review of systems same as previous: Yes


Cardiovascular: reports: high blood pressure


Respiratory: reports: shortness of breath


Gastrointestinal: denies: heartburn, difficulty swallowing, nausea, vomitting, 

diarrhea, abdominal pain, other


Urinary: reports: frequency


Neurological: denies: headaches, seizure, head trauma, disorientation, speech 

dysfunction, gait or balance problems, fainting or unconsciousness, other


Psychiatric: denies: Attention Deficit Hyperactivity, anxiety, depression, mood 

disorder, claustrophobia, other


Ear/Nose/Throat: reports: wisdom teeth removed


Endocrine: reports: thyroid disease


Musculoskeletal: denies: joint pain, neck pain, back pain, joint swelling, 

muscle pain or cramping, mobility problems, other


Immunologic: denies: sneezing, rash, itching, allergies to food or environment, 

other





Physical Exam


Vital signs obtained and entered by: DOROTEO ANTHONY CMA [facility]


Blood Pressure: 132/75 (RIGHT, PULSE 98, RESP 18, )


Heart Rate: 111 (2 X DROPPED TO 37)


O2 Saturation: 97 (PAPER MASK)


Height: 5 ft 8 in


Weight: 302 lb (WITH CLOTHES)


Body Mass Index: 45.9


BMI Classification: Morbidly Obese


Neck circumference: 14.5





Impression and Plan


IMPRESSION: 1. Obstructive Sleep Apnea-Hypopnea Syndrome, severe, with the 

patient doing well on nasal CPAP therapy.  She has excellent compliance and sign

ificant clinical improvement.  The current pressure appears effective but 

occasionally uncomfortable.  Overall, she is very satisfied with treatment and 

plans to continue with it long-term.  Because the residual AHI is very low, I 

will lower the pressure range for comfort.


2.  Bradycardia with heart rate of 39.  She is actually in a bigeminy rhythm and

her real heart rate is double that.  She is aware of the frequent premature 

ventricular contractions.





PLAN:     1.   Pressure reduced to 8 - 13 cmH2O manually on the device.


2.   Try to lose weight 


3.   Return in one year for follow up or earlier if there is any problem with 

the treatment. 





Adjust device pressure to (cmH2O): 8 - 13


Mask provided: No


Counseling Topics: Weight loss health impact


Follow up with Sleep Care in: 1 year


Visit Type: In Office


Time Spent with Patient (minutes): 15


Provider Statement: I spent 100% of the Face to Face Visit with the patient with

greater than 50% spent counseling the patient and coordination of care.

## 2022-06-10 ENCOUNTER — HOSPITAL ENCOUNTER (OUTPATIENT)
Dept: HOSPITAL 76 - LAB.N | Age: 67
Discharge: HOME | End: 2022-06-10
Payer: MEDICARE

## 2022-06-10 DIAGNOSIS — D86.9: Primary | ICD-10-CM

## 2022-06-10 LAB
ALBUMIN DIAFP-MCNC: 3.9 G/DL (ref 3.2–5.5)
ALP SERPL-CCNC: 60 IU/L (ref 42–121)
ALT SERPL W P-5'-P-CCNC: 38 IU/L (ref 10–60)
ANION GAP SERPL CALCULATED.4IONS-SCNC: 8 MMOL/L (ref 6–13)
AST SERPL W P-5'-P-CCNC: 27 IU/L (ref 10–42)
BASOPHILS NFR BLD AUTO: 0.1 10^3/UL (ref 0–0.1)
BASOPHILS NFR BLD AUTO: 0.4 %
BILIRUB BLD-MCNC: 0.7 MG/DL (ref 0.2–1)
BILIRUB DIRECT SERPL-MCNC: 0.1 MG/DL (ref 0.1–0.5)
BUN SERPL-MCNC: 28 MG/DL (ref 6–20)
CALCIUM UR-MCNC: 10.8 MG/DL (ref 8.5–10.3)
CHLORIDE SERPL-SCNC: 99 MMOL/L (ref 101–111)
CO2 SERPL-SCNC: 29 MMOL/L (ref 21–32)
CREAT SERPLBLD-SCNC: 1.7 MG/DL (ref 0.4–1)
EOSINOPHIL # BLD AUTO: 0 10^3/UL (ref 0–0.7)
EOSINOPHIL NFR BLD AUTO: 0.3 %
ERYTHROCYTE [DISTWIDTH] IN BLOOD BY AUTOMATED COUNT: 13.8 % (ref 12–15)
GFRSERPLBLD MDRD-ARVRAT: 30 ML/MIN/{1.73_M2} (ref 89–?)
GLOBULIN SER-MCNC: 3.6 G/DL (ref 2.1–4.2)
GLUCOSE SERPL-MCNC: 150 MG/DL (ref 70–100)
HCT VFR BLD AUTO: 44.7 % (ref 37–47)
HGB UR QL STRIP: 14.6 G/DL (ref 12–16)
LYMPHOCYTES # SPEC AUTO: 1.2 10^3/UL (ref 1.5–3.5)
LYMPHOCYTES NFR BLD AUTO: 10 %
MCH RBC QN AUTO: 30.5 PG (ref 27–31)
MCHC RBC AUTO-ENTMCNC: 32.7 G/DL (ref 32–36)
MCV RBC AUTO: 93.5 FL (ref 81–99)
MONOCYTES # BLD AUTO: 0.6 10^3/UL (ref 0–1)
MONOCYTES NFR BLD AUTO: 5.4 %
NEUTROPHILS # BLD AUTO: 9.6 10^3/UL (ref 1.5–6.6)
NEUTROPHILS # SNV AUTO: 11.5 X10^3/UL (ref 4.8–10.8)
NEUTROPHILS NFR BLD AUTO: 83.3 %
NRBC # BLD AUTO: 0 /100WBC
NRBC # BLD AUTO: 0 X10^3/UL
PDW BLD AUTO: 10.1 FL (ref 7.9–10.8)
PLATELET # BLD: 225 10^3/UL (ref 130–450)
POTASSIUM SERPL-SCNC: 4.2 MMOL/L (ref 3.5–5)
PROT SPEC-MCNC: 7.5 G/DL (ref 6.7–8.2)
RBC MAR: 4.78 10^6/UL (ref 4.2–5.4)
SODIUM SERPLBLD-SCNC: 136 MMOL/L (ref 135–145)

## 2022-06-10 PROCEDURE — 80076 HEPATIC FUNCTION PANEL: CPT

## 2022-06-10 PROCEDURE — 80048 BASIC METABOLIC PNL TOTAL CA: CPT

## 2022-06-10 PROCEDURE — 85025 COMPLETE CBC W/AUTO DIFF WBC: CPT

## 2022-06-10 PROCEDURE — 36415 COLL VENOUS BLD VENIPUNCTURE: CPT

## 2022-06-24 ENCOUNTER — HOSPITAL ENCOUNTER (OUTPATIENT)
Dept: HOSPITAL 76 - LAB.N | Age: 67
Discharge: HOME | End: 2022-06-24
Attending: INTERNAL MEDICINE
Payer: MEDICARE

## 2022-06-24 DIAGNOSIS — R06.02: ICD-10-CM

## 2022-06-24 DIAGNOSIS — D86.9: Primary | ICD-10-CM

## 2022-06-24 LAB
ALBUMIN DIAFP-MCNC: 4.1 G/DL (ref 3.2–5.5)
ALP SERPL-CCNC: 52 IU/L (ref 42–121)
ALT SERPL W P-5'-P-CCNC: 33 IU/L (ref 10–60)
ANION GAP SERPL CALCULATED.4IONS-SCNC: 10 MMOL/L (ref 6–13)
AST SERPL W P-5'-P-CCNC: 24 IU/L (ref 10–42)
BASOPHILS NFR BLD AUTO: 0.1 10^3/UL (ref 0–0.1)
BASOPHILS NFR BLD AUTO: 0.6 %
BILIRUB BLD-MCNC: 0.9 MG/DL (ref 0.2–1)
BILIRUB DIRECT SERPL-MCNC: 0.1 MG/DL (ref 0.1–0.5)
BUN SERPL-MCNC: 22 MG/DL (ref 6–20)
CALCIUM UR-MCNC: 9.7 MG/DL (ref 8.5–10.3)
CHLORIDE SERPL-SCNC: 103 MMOL/L (ref 101–111)
CO2 SERPL-SCNC: 29 MMOL/L (ref 21–32)
CREAT SERPLBLD-SCNC: 1.5 MG/DL (ref 0.4–1)
EOSINOPHIL # BLD AUTO: 0.1 10^3/UL (ref 0–0.7)
EOSINOPHIL NFR BLD AUTO: 0.9 %
ERYTHROCYTE [DISTWIDTH] IN BLOOD BY AUTOMATED COUNT: 14.1 % (ref 12–15)
GFRSERPLBLD MDRD-ARVRAT: 34 ML/MIN/{1.73_M2} (ref 89–?)
GLOBULIN SER-MCNC: 3.3 G/DL (ref 2.1–4.2)
GLUCOSE SERPL-MCNC: 101 MG/DL (ref 70–100)
HCT VFR BLD AUTO: 43.9 % (ref 37–47)
HGB UR QL STRIP: 14.8 G/DL (ref 12–16)
LYMPHOCYTES # SPEC AUTO: 1.8 10^3/UL (ref 1.5–3.5)
LYMPHOCYTES NFR BLD AUTO: 19.5 %
MCH RBC QN AUTO: 31.4 PG (ref 27–31)
MCHC RBC AUTO-ENTMCNC: 33.7 G/DL (ref 32–36)
MCV RBC AUTO: 93 FL (ref 81–99)
MONOCYTES # BLD AUTO: 1.1 10^3/UL (ref 0–1)
MONOCYTES NFR BLD AUTO: 11.9 %
NEUTROPHILS # BLD AUTO: 6 10^3/UL (ref 1.5–6.6)
NEUTROPHILS # SNV AUTO: 9 X10^3/UL (ref 4.8–10.8)
NEUTROPHILS NFR BLD AUTO: 66.3 %
NRBC # BLD AUTO: 0 /100WBC
NRBC # BLD AUTO: 0 X10^3/UL
PDW BLD AUTO: 10.1 FL (ref 7.9–10.8)
PLATELET # BLD: 249 10^3/UL (ref 130–450)
POTASSIUM SERPL-SCNC: 3.4 MMOL/L (ref 3.5–5)
PROT SPEC-MCNC: 7.4 G/DL (ref 6.7–8.2)
RBC MAR: 4.72 10^6/UL (ref 4.2–5.4)
SODIUM SERPLBLD-SCNC: 142 MMOL/L (ref 135–145)

## 2022-06-24 PROCEDURE — 83880 ASSAY OF NATRIURETIC PEPTIDE: CPT

## 2022-06-24 PROCEDURE — 81599 UNLISTED MAAA: CPT

## 2022-06-24 PROCEDURE — 85018 HEMOGLOBIN: CPT

## 2022-06-24 PROCEDURE — 36415 COLL VENOUS BLD VENIPUNCTURE: CPT

## 2022-06-24 PROCEDURE — 80076 HEPATIC FUNCTION PANEL: CPT

## 2022-06-24 PROCEDURE — 85025 COMPLETE CBC W/AUTO DIFF WBC: CPT

## 2022-06-24 PROCEDURE — 80048 BASIC METABOLIC PNL TOTAL CA: CPT

## 2022-06-24 PROCEDURE — 82955 ASSAY OF G6PD ENZYME: CPT

## 2022-07-09 ENCOUNTER — HOSPITAL ENCOUNTER (OUTPATIENT)
Dept: HOSPITAL 76 - LAB.N | Age: 67
Discharge: HOME | End: 2022-07-09
Attending: INTERNAL MEDICINE
Payer: MEDICARE

## 2022-07-09 DIAGNOSIS — E11.9: ICD-10-CM

## 2022-07-09 DIAGNOSIS — D86.9: Primary | ICD-10-CM

## 2022-07-09 LAB
ALBUMIN DIAFP-MCNC: 3.9 G/DL (ref 3.2–5.5)
ALP SERPL-CCNC: 52 IU/L (ref 42–121)
ALT SERPL W P-5'-P-CCNC: 34 IU/L (ref 10–60)
ANION GAP SERPL CALCULATED.4IONS-SCNC: 10 MMOL/L (ref 6–13)
AST SERPL W P-5'-P-CCNC: 28 IU/L (ref 10–42)
BASOPHILS NFR BLD AUTO: 0.1 10^3/UL (ref 0–0.1)
BASOPHILS NFR BLD AUTO: 0.7 %
BILIRUB BLD-MCNC: 0.9 MG/DL (ref 0.2–1)
BILIRUB DIRECT SERPL-MCNC: 0.2 MG/DL (ref 0.1–0.5)
BUN SERPL-MCNC: 29 MG/DL (ref 6–20)
CALCIUM UR-MCNC: 9.6 MG/DL (ref 8.5–10.3)
CHLORIDE SERPL-SCNC: 99 MMOL/L (ref 101–111)
CO2 SERPL-SCNC: 31 MMOL/L (ref 21–32)
CREAT SERPLBLD-SCNC: 1.7 MG/DL (ref 0.4–1)
EOSINOPHIL # BLD AUTO: 0.1 10^3/UL (ref 0–0.7)
EOSINOPHIL NFR BLD AUTO: 1.3 %
ERYTHROCYTE [DISTWIDTH] IN BLOOD BY AUTOMATED COUNT: 14.3 % (ref 12–15)
GFRSERPLBLD MDRD-ARVRAT: 30 ML/MIN/{1.73_M2} (ref 89–?)
GLOBULIN SER-MCNC: 3.3 G/DL (ref 2.1–4.2)
GLUCOSE SERPL-MCNC: 105 MG/DL (ref 70–100)
HCT VFR BLD AUTO: 46.2 % (ref 37–47)
HGB UR QL STRIP: 14.7 G/DL (ref 12–16)
LYMPHOCYTES # SPEC AUTO: 1.7 10^3/UL (ref 1.5–3.5)
LYMPHOCYTES NFR BLD AUTO: 21.8 %
MCH RBC QN AUTO: 30.2 PG (ref 27–31)
MCHC RBC AUTO-ENTMCNC: 31.8 G/DL (ref 32–36)
MCV RBC AUTO: 94.9 FL (ref 81–99)
MONOCYTES # BLD AUTO: 1.1 10^3/UL (ref 0–1)
MONOCYTES NFR BLD AUTO: 14.8 %
NEUTROPHILS # BLD AUTO: 4.7 10^3/UL (ref 1.5–6.6)
NEUTROPHILS # SNV AUTO: 7.7 X10^3/UL (ref 4.8–10.8)
NEUTROPHILS NFR BLD AUTO: 60.9 %
NRBC # BLD AUTO: 0 /100WBC
NRBC # BLD AUTO: 0 X10^3/UL
PDW BLD AUTO: 10.7 FL (ref 7.9–10.8)
PLATELET # BLD: 226 10^3/UL (ref 130–450)
POTASSIUM SERPL-SCNC: 3.2 MMOL/L (ref 3.5–5)
PROT SPEC-MCNC: 7.2 G/DL (ref 6.7–8.2)
RBC MAR: 4.87 10^6/UL (ref 4.2–5.4)
SODIUM SERPLBLD-SCNC: 140 MMOL/L (ref 135–145)

## 2022-07-09 PROCEDURE — 36415 COLL VENOUS BLD VENIPUNCTURE: CPT

## 2022-07-09 PROCEDURE — 80076 HEPATIC FUNCTION PANEL: CPT

## 2022-07-09 PROCEDURE — 80048 BASIC METABOLIC PNL TOTAL CA: CPT

## 2022-07-09 PROCEDURE — 83036 HEMOGLOBIN GLYCOSYLATED A1C: CPT

## 2022-07-09 PROCEDURE — 85025 COMPLETE CBC W/AUTO DIFF WBC: CPT

## 2022-07-10 LAB
EST. AVERAGE GLUCOSE BLD GHB EST-MCNC: 166 MG/DL (ref 70–100)
HBA1C MFR BLD HPLC: 7.4 % (ref 4.27–6.07)

## 2022-09-27 ENCOUNTER — HOSPITAL ENCOUNTER (OUTPATIENT)
Dept: HOSPITAL 76 - LAB.N | Age: 67
Discharge: HOME | End: 2022-09-27
Attending: INTERNAL MEDICINE
Payer: MEDICARE

## 2022-09-27 DIAGNOSIS — D86.9: ICD-10-CM

## 2022-09-27 DIAGNOSIS — I50.89: ICD-10-CM

## 2022-09-27 DIAGNOSIS — E11.9: Primary | ICD-10-CM

## 2022-09-27 LAB
ALBUMIN DIAFP-MCNC: 3.9 G/DL (ref 3.2–5.5)
ALBUMIN/GLOB SERPL: 1.3 {RATIO} (ref 1–2.2)
ALP SERPL-CCNC: 53 IU/L (ref 42–121)
ALT SERPL W P-5'-P-CCNC: 32 IU/L (ref 10–60)
ANION GAP SERPL CALCULATED.4IONS-SCNC: 12 MMOL/L (ref 6–13)
AST SERPL W P-5'-P-CCNC: 29 IU/L (ref 10–42)
BILIRUB BLD-MCNC: 1.3 MG/DL (ref 0.2–1)
BUN SERPL-MCNC: 25 MG/DL (ref 6–20)
CALCIUM UR-MCNC: 9.4 MG/DL (ref 8.5–10.3)
CHLORIDE SERPL-SCNC: 103 MMOL/L (ref 101–111)
CHOLEST SERPL-MCNC: 194 MG/DL
CO2 SERPL-SCNC: 23 MMOL/L (ref 21–32)
CREAT SERPLBLD-SCNC: 1.4 MG/DL (ref 0.4–1)
EST. AVERAGE GLUCOSE BLD GHB EST-MCNC: 157 MG/DL (ref 70–100)
GFRSERPLBLD MDRD-ARVRAT: 37 ML/MIN/{1.73_M2} (ref 89–?)
GLOBULIN SER-MCNC: 3.1 G/DL (ref 2.1–4.2)
GLUCOSE SERPL-MCNC: 106 MG/DL (ref 70–100)
HBA1C MFR BLD HPLC: 7.1 % (ref 4.27–6.07)
HDLC SERPL-MCNC: 80 MG/DL
HDLC SERPL: 2.4 {RATIO} (ref ?–4.4)
LDLC SERPL CALC-MCNC: 70 MG/DL
LDLC/HDLC SERPL: 0.9 {RATIO} (ref ?–4.4)
POTASSIUM SERPL-SCNC: 3.8 MMOL/L (ref 3.5–5)
PROT SPEC-MCNC: 7 G/DL (ref 6.7–8.2)
SODIUM SERPLBLD-SCNC: 138 MMOL/L (ref 135–145)
TRIGL P FAST SERPL-MCNC: 219 MG/DL
VLDLC SERPL-SCNC: 44 MG/DL

## 2022-09-27 PROCEDURE — 83721 ASSAY OF BLOOD LIPOPROTEIN: CPT

## 2022-09-27 PROCEDURE — 83036 HEMOGLOBIN GLYCOSYLATED A1C: CPT

## 2022-09-27 PROCEDURE — 80061 LIPID PANEL: CPT

## 2022-09-27 PROCEDURE — 80053 COMPREHEN METABOLIC PANEL: CPT

## 2022-09-27 PROCEDURE — 83880 ASSAY OF NATRIURETIC PEPTIDE: CPT

## 2022-09-27 PROCEDURE — 36415 COLL VENOUS BLD VENIPUNCTURE: CPT

## 2022-09-28 ENCOUNTER — HOSPITAL ENCOUNTER (OUTPATIENT)
Dept: HOSPITAL 76 - LAB.N | Age: 67
Discharge: HOME | End: 2022-09-28
Attending: INTERNAL MEDICINE
Payer: MEDICARE

## 2022-09-28 DIAGNOSIS — D86.9: Primary | ICD-10-CM

## 2022-09-28 LAB
ALBUMIN DIAFP-MCNC: 3.9 G/DL (ref 3.2–5.5)
ALP SERPL-CCNC: 55 IU/L (ref 42–121)
ALT SERPL W P-5'-P-CCNC: 37 IU/L (ref 10–60)
AST SERPL W P-5'-P-CCNC: 35 IU/L (ref 10–42)
BASOPHILS NFR BLD AUTO: 0.1 10^3/UL (ref 0–0.1)
BASOPHILS NFR BLD AUTO: 0.4 %
BILIRUB BLD-MCNC: 1.1 MG/DL (ref 0.2–1)
BILIRUB DIRECT SERPL-MCNC: 0.1 MG/DL (ref 0.1–0.5)
EOSINOPHIL # BLD AUTO: 0.1 10^3/UL (ref 0–0.7)
EOSINOPHIL NFR BLD AUTO: 0.5 %
ERYTHROCYTE [DISTWIDTH] IN BLOOD BY AUTOMATED COUNT: 14.5 % (ref 12–15)
GLOBULIN SER-MCNC: 3.2 G/DL (ref 2.1–4.2)
HCT VFR BLD AUTO: 48.8 % (ref 37–47)
HGB UR QL STRIP: 15.8 G/DL (ref 12–16)
LYMPHOCYTES # SPEC AUTO: 1.2 10^3/UL (ref 1.5–3.5)
LYMPHOCYTES NFR BLD AUTO: 10.7 %
MCH RBC QN AUTO: 28.9 PG (ref 27–31)
MCHC RBC AUTO-ENTMCNC: 32.4 G/DL (ref 32–36)
MCV RBC AUTO: 89.2 FL (ref 81–99)
MONOCYTES # BLD AUTO: 0.8 10^3/UL (ref 0–1)
MONOCYTES NFR BLD AUTO: 7.5 %
NEUTROPHILS # BLD AUTO: 9.1 10^3/UL (ref 1.5–6.6)
NEUTROPHILS # SNV AUTO: 11.3 X10^3/UL (ref 4.8–10.8)
NEUTROPHILS NFR BLD AUTO: 80.5 %
NRBC # BLD AUTO: 0 /100WBC
NRBC # BLD AUTO: 0 X10^3/UL
PDW BLD AUTO: 10.7 FL (ref 7.9–10.8)
PLATELET # BLD: 228 10^3/UL (ref 130–450)
PROT SPEC-MCNC: 7.1 G/DL (ref 6.7–8.2)
RBC MAR: 5.47 10^6/UL (ref 4.2–5.4)

## 2022-09-28 PROCEDURE — 36415 COLL VENOUS BLD VENIPUNCTURE: CPT

## 2022-09-28 PROCEDURE — 83880 ASSAY OF NATRIURETIC PEPTIDE: CPT

## 2022-09-28 PROCEDURE — 80076 HEPATIC FUNCTION PANEL: CPT

## 2022-09-28 PROCEDURE — 85025 COMPLETE CBC W/AUTO DIFF WBC: CPT

## 2022-09-29 ENCOUNTER — HOSPITAL ENCOUNTER (OUTPATIENT)
Dept: HOSPITAL 76 - LAB.N | Age: 67
Discharge: HOME | End: 2022-09-29
Attending: INTERNAL MEDICINE
Payer: MEDICARE

## 2022-09-29 DIAGNOSIS — D86.9: Primary | ICD-10-CM

## 2022-09-29 LAB
ANION GAP SERPL CALCULATED.4IONS-SCNC: 11 MMOL/L (ref 6–13)
BUN SERPL-MCNC: 23 MG/DL (ref 6–20)
CALCIUM UR-MCNC: 9.4 MG/DL (ref 8.5–10.3)
CHLORIDE SERPL-SCNC: 107 MMOL/L (ref 101–111)
CO2 SERPL-SCNC: 24 MMOL/L (ref 21–32)
CREAT SERPLBLD-SCNC: 1.5 MG/DL (ref 0.4–1)
GFRSERPLBLD MDRD-ARVRAT: 34 ML/MIN/{1.73_M2} (ref 89–?)
GLUCOSE SERPL-MCNC: 105 MG/DL (ref 70–100)
POTASSIUM SERPL-SCNC: 3.9 MMOL/L (ref 3.5–5)
SODIUM SERPLBLD-SCNC: 142 MMOL/L (ref 135–145)

## 2022-09-29 PROCEDURE — 36415 COLL VENOUS BLD VENIPUNCTURE: CPT

## 2022-09-29 PROCEDURE — 80048 BASIC METABOLIC PNL TOTAL CA: CPT

## 2022-12-22 ENCOUNTER — HOSPITAL ENCOUNTER (OUTPATIENT)
Dept: HOSPITAL 76 - LAB.N | Age: 67
Discharge: HOME | End: 2022-12-22
Payer: MEDICARE

## 2022-12-22 DIAGNOSIS — D84.821: Primary | ICD-10-CM

## 2022-12-22 DIAGNOSIS — Z79.899: ICD-10-CM

## 2022-12-22 LAB
ALBUMIN DIAFP-MCNC: 4 G/DL (ref 3.2–5.5)
ALP SERPL-CCNC: 57 IU/L (ref 42–121)
ALT SERPL W P-5'-P-CCNC: 36 IU/L (ref 10–60)
AST SERPL W P-5'-P-CCNC: 33 IU/L (ref 10–42)
BILIRUB BLD-MCNC: 0.9 MG/DL (ref 0.2–1)
BILIRUB DIRECT SERPL-MCNC: 0.1 MG/DL (ref 0.1–0.5)
GLOBULIN SER-MCNC: 3.2 G/DL (ref 2.1–4.2)
PROT SPEC-MCNC: 7.2 G/DL (ref 6.7–8.2)

## 2022-12-22 PROCEDURE — 36415 COLL VENOUS BLD VENIPUNCTURE: CPT

## 2022-12-22 PROCEDURE — 80076 HEPATIC FUNCTION PANEL: CPT

## 2023-05-01 ENCOUNTER — HOSPITAL ENCOUNTER (OUTPATIENT)
Dept: HOSPITAL 76 - LAB.N | Age: 68
Discharge: HOME | End: 2023-05-01
Attending: INTERNAL MEDICINE
Payer: MEDICARE

## 2023-05-01 DIAGNOSIS — I42.0: Primary | ICD-10-CM

## 2023-05-01 DIAGNOSIS — I49.3: ICD-10-CM

## 2023-05-01 LAB
ANION GAP SERPL CALCULATED.4IONS-SCNC: 10 MMOL/L (ref 6–13)
BUN SERPL-MCNC: 26 MG/DL (ref 6–20)
CALCIUM UR-MCNC: 9.8 MG/DL (ref 8.5–10.3)
CHLORIDE SERPL-SCNC: 104 MMOL/L (ref 101–111)
CO2 SERPL-SCNC: 26 MMOL/L (ref 21–32)
CREAT SERPLBLD-SCNC: 2.1 MG/DL (ref 0.4–1)
GFRSERPLBLD MDRD-ARVRAT: 23 ML/MIN/{1.73_M2} (ref 89–?)
GLUCOSE SERPL-MCNC: 153 MG/DL (ref 70–100)
POTASSIUM SERPL-SCNC: 3.6 MMOL/L (ref 3.5–5)
SODIUM SERPLBLD-SCNC: 140 MMOL/L (ref 135–145)

## 2023-05-01 PROCEDURE — 80048 BASIC METABOLIC PNL TOTAL CA: CPT

## 2023-05-01 PROCEDURE — 36415 COLL VENOUS BLD VENIPUNCTURE: CPT

## 2023-06-30 ENCOUNTER — HOSPITAL ENCOUNTER (OUTPATIENT)
Dept: HOSPITAL 76 - LAB.N | Age: 68
Discharge: HOME | End: 2023-06-30
Attending: INTERNAL MEDICINE
Payer: MEDICARE

## 2023-06-30 DIAGNOSIS — I42.0: Primary | ICD-10-CM

## 2023-06-30 LAB
ANION GAP SERPL CALCULATED.4IONS-SCNC: 9 MMOL/L (ref 6–13)
BUN SERPL-MCNC: 36 MG/DL (ref 6–20)
CALCIUM UR-MCNC: 9.6 MG/DL (ref 8.5–10.3)
CHLORIDE SERPL-SCNC: 104 MMOL/L (ref 101–111)
CO2 SERPL-SCNC: 29 MMOL/L (ref 21–32)
CREAT SERPLBLD-SCNC: 1.8 MG/DL (ref 0.4–1)
GFRSERPLBLD MDRD-ARVRAT: 28 ML/MIN/{1.73_M2} (ref 89–?)
GLUCOSE SERPL-MCNC: 84 MG/DL (ref 70–100)
POTASSIUM SERPL-SCNC: 3.8 MMOL/L (ref 3.5–5)
SODIUM SERPLBLD-SCNC: 142 MMOL/L (ref 135–145)

## 2023-06-30 PROCEDURE — 80048 BASIC METABOLIC PNL TOTAL CA: CPT

## 2023-06-30 PROCEDURE — 36415 COLL VENOUS BLD VENIPUNCTURE: CPT

## 2023-10-06 ENCOUNTER — HOSPITAL ENCOUNTER (OUTPATIENT)
Dept: HOSPITAL 76 - LAB.N | Age: 68
Discharge: HOME | End: 2023-10-06
Attending: INTERNAL MEDICINE
Payer: MEDICARE

## 2023-10-06 DIAGNOSIS — I42.0: Primary | ICD-10-CM

## 2023-10-06 LAB
ANION GAP SERPL CALCULATED.4IONS-SCNC: 12 MMOL/L (ref 6–13)
BUN SERPL-MCNC: 57 MG/DL (ref 6–20)
CALCIUM UR-MCNC: 10 MG/DL (ref 8.5–10.3)
CHLORIDE SERPL-SCNC: 102 MMOL/L (ref 101–111)
CO2 SERPL-SCNC: 25 MMOL/L (ref 21–32)
CREAT SERPLBLD-SCNC: 2.6 MG/DL (ref 0.6–1.3)
GFRSERPLBLD MDRD-ARVRAT: 18 ML/MIN/{1.73_M2} (ref 89–?)
GLUCOSE SERPL-MCNC: 105 MG/DL (ref 74–104)
POTASSIUM SERPL-SCNC: 4.2 MMOL/L (ref 3.5–4.5)
SODIUM SERPLBLD-SCNC: 139 MMOL/L (ref 135–145)

## 2023-10-06 PROCEDURE — 80048 BASIC METABOLIC PNL TOTAL CA: CPT

## 2023-10-06 PROCEDURE — 36415 COLL VENOUS BLD VENIPUNCTURE: CPT

## 2023-12-15 ENCOUNTER — HOSPITAL ENCOUNTER (OUTPATIENT)
Dept: HOSPITAL 76 - LAB.R | Age: 68
Discharge: HOME | End: 2023-12-15
Attending: FAMILY MEDICINE
Payer: MEDICARE

## 2023-12-15 DIAGNOSIS — R19.7: Primary | ICD-10-CM

## 2023-12-15 LAB — HEMOCCULT STL QL IA: NEGATIVE

## 2023-12-15 PROCEDURE — 87177 OVA AND PARASITES SMEARS: CPT

## 2023-12-15 PROCEDURE — 87329 GIARDIA AG IA: CPT

## 2023-12-15 PROCEDURE — 87046 STOOL CULTR AEROBIC BACT EA: CPT

## 2023-12-15 PROCEDURE — 83993 ASSAY FOR CALPROTECTIN FECAL: CPT

## 2023-12-15 PROCEDURE — 82274 ASSAY TEST FOR BLOOD FECAL: CPT

## 2023-12-15 PROCEDURE — 87209 SMEAR COMPLEX STAIN: CPT

## 2023-12-15 PROCEDURE — 87045 FECES CULTURE AEROBIC BACT: CPT

## 2023-12-15 PROCEDURE — 87493 C DIFF AMPLIFIED PROBE: CPT

## 2023-12-15 PROCEDURE — 87427 SHIGA-LIKE TOXIN AG IA: CPT

## 2024-01-04 ENCOUNTER — HOSPITAL ENCOUNTER (OUTPATIENT)
Dept: HOSPITAL 76 - LAB | Age: 69
Discharge: HOME | End: 2024-01-04
Attending: INTERNAL MEDICINE
Payer: MEDICARE

## 2024-01-04 DIAGNOSIS — N17.0: Primary | ICD-10-CM

## 2024-01-04 LAB
ALBUMIN DIAFP-MCNC: 4.1 G/DL (ref 3.2–5.5)
ANION GAP SERPL CALCULATED.4IONS-SCNC: 12 MMOL/L (ref 6–13)
BASOPHILS NFR BLD AUTO: 0.1 10^3/UL (ref 0–0.1)
BASOPHILS NFR BLD AUTO: 0.7 %
BUN SERPL-MCNC: 41 MG/DL (ref 6–20)
CALCIUM UR-MCNC: 9.4 MG/DL (ref 8.5–10.3)
CHLORIDE SERPL-SCNC: 100 MMOL/L (ref 101–111)
CO2 SERPL-SCNC: 25 MMOL/L (ref 21–32)
CREAT SERPLBLD-SCNC: 2.6 MG/DL (ref 0.6–1.3)
EOSINOPHIL # BLD AUTO: 0 10^3/UL (ref 0–0.7)
EOSINOPHIL NFR BLD AUTO: 0.1 %
ERYTHROCYTE [DISTWIDTH] IN BLOOD BY AUTOMATED COUNT: 14.7 % (ref 12–15)
GFRSERPLBLD MDRD-ARVRAT: 18 ML/MIN/{1.73_M2} (ref 89–?)
GLUCOSE SERPL-MCNC: 303 MG/DL (ref 74–104)
HCT VFR BLD AUTO: 41 % (ref 37–47)
HGB UR QL STRIP: 12.4 G/DL (ref 12–16)
LYMPHOCYTES # SPEC AUTO: 0.7 10^3/UL (ref 1.5–3.5)
LYMPHOCYTES NFR BLD AUTO: 7.7 %
MCH RBC QN AUTO: 29 PG (ref 27–31)
MCHC RBC AUTO-ENTMCNC: 30.2 G/DL (ref 32–36)
MCV RBC AUTO: 96 FL (ref 81–99)
MONOCYTES # BLD AUTO: 0.4 10^3/UL (ref 0–1)
MONOCYTES NFR BLD AUTO: 4.8 %
NEUTROPHILS # BLD AUTO: 7.4 10^3/UL (ref 1.5–6.6)
NEUTROPHILS # SNV AUTO: 8.6 X10^3/UL (ref 4.8–10.8)
NEUTROPHILS NFR BLD AUTO: 86.1 %
NRBC # BLD AUTO: 0 /100WBC
NRBC # BLD AUTO: 0 X10^3/UL
PDW BLD AUTO: 9.6 FL (ref 7.9–10.8)
PHOSPHATE BLD-MCNC: 3.2 MG/DL (ref 2.5–5)
PLATELET # BLD: 249 10^3/UL (ref 130–450)
POTASSIUM SERPL-SCNC: 4.1 MMOL/L (ref 3.5–4.5)
RBC MAR: 4.27 10^6/UL (ref 4.2–5.4)
SODIUM SERPLBLD-SCNC: 137 MMOL/L (ref 135–145)

## 2024-01-04 PROCEDURE — 80069 RENAL FUNCTION PANEL: CPT

## 2024-01-04 PROCEDURE — 84156 ASSAY OF PROTEIN URINE: CPT

## 2024-01-04 PROCEDURE — 83970 ASSAY OF PARATHORMONE: CPT

## 2024-01-04 PROCEDURE — 81001 URINALYSIS AUTO W/SCOPE: CPT

## 2024-01-04 PROCEDURE — 85025 COMPLETE CBC W/AUTO DIFF WBC: CPT

## 2024-01-04 PROCEDURE — 82570 ASSAY OF URINE CREATININE: CPT

## 2024-01-04 PROCEDURE — 81003 URINALYSIS AUTO W/O SCOPE: CPT

## 2024-01-04 PROCEDURE — 36415 COLL VENOUS BLD VENIPUNCTURE: CPT

## 2024-01-05 ENCOUNTER — HOSPITAL ENCOUNTER (OUTPATIENT)
Dept: HOSPITAL 76 - LAB.R | Age: 69
Discharge: HOME | End: 2024-01-05
Attending: INTERNAL MEDICINE
Payer: MEDICARE

## 2024-01-05 DIAGNOSIS — N17.0: Primary | ICD-10-CM

## 2024-01-05 LAB
CLARITY UR REFRACT.AUTO: CLEAR
CREAT UR-SCNC: 41.2 MG/DL
GLUCOSE UR QL STRIP.AUTO: NEGATIVE MG/DL
KETONES UR QL STRIP.AUTO: NEGATIVE MG/DL
NITRITE UR QL STRIP.AUTO: NEGATIVE
PH UR STRIP.AUTO: 5.5 PH (ref 5–7.5)
PROT 24H UR-MCNC: 22 MG/DL
PROT UR STRIP.AUTO-MCNC: NEGATIVE MG/DL
RBC # UR STRIP.AUTO: NEGATIVE /UL
SP GR UR STRIP.AUTO: 1.01 (ref 1–1.03)
UROBILINOGEN UR QL STRIP.AUTO: (no result) E.U./DL
UROBILINOGEN UR STRIP.AUTO-MCNC: NEGATIVE MG/DL

## 2024-01-05 PROCEDURE — 81001 URINALYSIS AUTO W/SCOPE: CPT

## 2024-01-05 PROCEDURE — 81003 URINALYSIS AUTO W/O SCOPE: CPT

## 2024-01-05 PROCEDURE — 84156 ASSAY OF PROTEIN URINE: CPT

## 2024-01-05 PROCEDURE — 82570 ASSAY OF URINE CREATININE: CPT

## 2024-01-22 ENCOUNTER — HOSPITAL ENCOUNTER (OUTPATIENT)
Dept: HOSPITAL 76 - LAB.N | Age: 69
Discharge: HOME | End: 2024-01-22
Attending: INTERNAL MEDICINE
Payer: MEDICARE

## 2024-01-22 DIAGNOSIS — I50.32: Primary | ICD-10-CM

## 2024-01-22 LAB
ANION GAP SERPL CALCULATED.4IONS-SCNC: 11 MMOL/L (ref 6–13)
BUN SERPL-MCNC: 40 MG/DL (ref 6–20)
CALCIUM UR-MCNC: 9.9 MG/DL (ref 8.5–10.3)
CHLORIDE SERPL-SCNC: 103 MMOL/L (ref 101–111)
CO2 SERPL-SCNC: 29 MMOL/L (ref 21–32)
CREAT SERPLBLD-SCNC: 2.5 MG/DL (ref 0.6–1.3)
GFRSERPLBLD MDRD-ARVRAT: 19 ML/MIN/{1.73_M2} (ref 89–?)
GLUCOSE SERPL-MCNC: 93 MG/DL (ref 74–104)
POTASSIUM SERPL-SCNC: 3.5 MMOL/L (ref 3.5–4.5)
SODIUM SERPLBLD-SCNC: 143 MMOL/L (ref 135–145)

## 2024-01-22 PROCEDURE — 36415 COLL VENOUS BLD VENIPUNCTURE: CPT

## 2024-01-22 PROCEDURE — 80048 BASIC METABOLIC PNL TOTAL CA: CPT

## 2024-02-08 ENCOUNTER — HOSPITAL ENCOUNTER (OUTPATIENT)
Dept: HOSPITAL 76 - LAB.N | Age: 69
Discharge: HOME | End: 2024-02-08
Attending: INTERNAL MEDICINE
Payer: MEDICARE

## 2024-02-08 DIAGNOSIS — E11.69: ICD-10-CM

## 2024-02-08 DIAGNOSIS — Z79.4: ICD-10-CM

## 2024-02-08 DIAGNOSIS — I50.32: Primary | ICD-10-CM

## 2024-02-08 LAB
ANION GAP SERPL CALCULATED.4IONS-SCNC: 11 MMOL/L (ref 6–13)
BUN SERPL-MCNC: 45 MG/DL (ref 6–20)
CALCIUM UR-MCNC: 10.6 MG/DL (ref 8.5–10.3)
CHLORIDE SERPL-SCNC: 98 MMOL/L (ref 101–111)
CO2 SERPL-SCNC: 29 MMOL/L (ref 21–32)
CREAT SERPLBLD-SCNC: 3 MG/DL (ref 0.6–1.3)
CREAT UR-SCNC: 57.7 MG/DL
EST. AVERAGE GLUCOSE BLD GHB EST-MCNC: 134 MG/DL (ref 70–100)
GFRSERPLBLD MDRD-ARVRAT: 15 ML/MIN/{1.73_M2} (ref 89–?)
GLUCOSE SERPL-MCNC: 231 MG/DL (ref 74–104)
HBA1C MFR BLD HPLC: 6.3 % (ref 4.27–6.07)
MICROALBUMIN UR-MCNC: 4 MG/DL
MICROALBUMIN/CREAT RATIO PNL UR: 69.3 UG/MG (ref ?–30)
POTASSIUM SERPL-SCNC: 3.9 MMOL/L (ref 3.5–4.5)
SODIUM SERPLBLD-SCNC: 138 MMOL/L (ref 135–145)

## 2024-02-08 PROCEDURE — 82043 UR ALBUMIN QUANTITATIVE: CPT

## 2024-02-08 PROCEDURE — 80048 BASIC METABOLIC PNL TOTAL CA: CPT

## 2024-02-08 PROCEDURE — 82570 ASSAY OF URINE CREATININE: CPT

## 2024-02-08 PROCEDURE — 83036 HEMOGLOBIN GLYCOSYLATED A1C: CPT

## 2024-02-08 PROCEDURE — 36415 COLL VENOUS BLD VENIPUNCTURE: CPT

## 2024-04-15 ENCOUNTER — HOSPITAL ENCOUNTER (OUTPATIENT)
Dept: HOSPITAL 76 - EMS | Age: 69
Discharge: ANOTHER HOSPITAL | End: 2024-04-15
Payer: MEDICARE

## 2024-04-15 DIAGNOSIS — R53.81: ICD-10-CM

## 2024-04-15 DIAGNOSIS — R19.7: ICD-10-CM

## 2024-04-15 DIAGNOSIS — Z95.0: ICD-10-CM

## 2024-04-15 DIAGNOSIS — R07.89: Primary | ICD-10-CM

## 2024-04-15 DIAGNOSIS — I48.91: ICD-10-CM

## 2024-04-15 DIAGNOSIS — R68.83: ICD-10-CM

## 2024-04-15 DIAGNOSIS — R25.1: ICD-10-CM

## 2024-04-15 DIAGNOSIS — R11.2: ICD-10-CM

## 2024-07-10 ENCOUNTER — HOSPITAL ENCOUNTER (OUTPATIENT)
Dept: HOSPITAL 76 - LAB.N | Age: 69
Discharge: HOME | End: 2024-07-10
Attending: STUDENT IN AN ORGANIZED HEALTH CARE EDUCATION/TRAINING PROGRAM
Payer: MEDICARE

## 2024-07-10 DIAGNOSIS — N18.9: ICD-10-CM

## 2024-07-10 DIAGNOSIS — E03.9: ICD-10-CM

## 2024-07-10 DIAGNOSIS — E83.52: ICD-10-CM

## 2024-07-10 DIAGNOSIS — I12.9: Primary | ICD-10-CM

## 2024-07-10 LAB
ALBUMIN DIAFP-MCNC: 4.3 G/DL (ref 3.2–5.5)
ALBUMIN/GLOB SERPL: 1.5 {RATIO} (ref 1–2.2)
ALP SERPL-CCNC: 44 IU/L (ref 42–121)
ALT SERPL W P-5'-P-CCNC: 10 IU/L (ref 10–60)
ANION GAP SERPL CALCULATED.4IONS-SCNC: 12 MMOL/L (ref 6–13)
AST SERPL W P-5'-P-CCNC: 12 IU/L (ref 10–42)
BASOPHILS NFR BLD AUTO: 0 10^3/UL (ref 0–0.1)
BASOPHILS NFR BLD AUTO: 0.5 %
BILIRUB BLD-MCNC: 0.5 MG/DL (ref 0.2–1)
BUN SERPL-MCNC: 79 MG/DL (ref 6–20)
CALCIUM UR-MCNC: 11 MG/DL (ref 8.5–10.3)
CHLORIDE SERPL-SCNC: 103 MMOL/L (ref 101–111)
CO2 SERPL-SCNC: 22 MMOL/L (ref 21–32)
CREAT SERPLBLD-SCNC: 3.7 MG/DL (ref 0.6–1.3)
EOSINOPHIL # BLD AUTO: 0.1 10^3/UL (ref 0–0.7)
EOSINOPHIL NFR BLD AUTO: 0.9 %
ERYTHROCYTE [DISTWIDTH] IN BLOOD BY AUTOMATED COUNT: 13.8 % (ref 12–15)
GFRSERPLBLD MDRD-ARVRAT: 12 ML/MIN/{1.73_M2} (ref 89–?)
GLOBULIN SER-MCNC: 2.9 G/DL (ref 2.1–4.2)
GLUCOSE SERPL-MCNC: 102 MG/DL (ref 74–104)
HCT VFR BLD AUTO: 36.8 % (ref 37–47)
HGB UR QL STRIP: 11.3 G/DL (ref 12–16)
LYMPHOCYTES # SPEC AUTO: 1.5 10^3/UL (ref 1.5–3.5)
LYMPHOCYTES NFR BLD AUTO: 20.5 %
MCH RBC QN AUTO: 29.3 PG (ref 27–31)
MCHC RBC AUTO-ENTMCNC: 30.7 G/DL (ref 32–36)
MCV RBC AUTO: 95.3 FL (ref 81–99)
MONOCYTES # BLD AUTO: 1.1 10^3/UL (ref 0–1)
MONOCYTES NFR BLD AUTO: 13.9 %
NEUTROPHILS # BLD AUTO: 4.8 10^3/UL (ref 1.5–6.6)
NEUTROPHILS # SNV AUTO: 7.5 X10^3/UL (ref 4.8–10.8)
NEUTROPHILS NFR BLD AUTO: 63.8 %
NRBC # BLD AUTO: 0 /100WBC
NRBC # BLD AUTO: 0 X10^3/UL
PDW BLD AUTO: 10 FL (ref 7.9–10.8)
PLATELET # BLD: 272 10^3/UL (ref 130–450)
POTASSIUM SERPL-SCNC: 3.7 MMOL/L (ref 3.5–4.5)
PROT SPEC-MCNC: 7.2 G/DL (ref 6.4–8.9)
RBC MAR: 3.86 10^6/UL (ref 4.2–5.4)
SODIUM SERPLBLD-SCNC: 137 MMOL/L (ref 135–145)

## 2024-07-10 PROCEDURE — 84165 PROTEIN E-PHORESIS SERUM: CPT

## 2024-07-10 PROCEDURE — 81599 UNLISTED MAAA: CPT

## 2024-07-10 PROCEDURE — 84155 ASSAY OF PROTEIN SERUM: CPT

## 2024-07-10 PROCEDURE — 82310 ASSAY OF CALCIUM: CPT

## 2024-07-10 PROCEDURE — 82330 ASSAY OF CALCIUM: CPT

## 2024-07-10 PROCEDURE — 80053 COMPREHEN METABOLIC PANEL: CPT

## 2024-07-10 PROCEDURE — 86334 IMMUNOFIX E-PHORESIS SERUM: CPT

## 2024-07-10 PROCEDURE — 82784 ASSAY IGA/IGD/IGG/IGM EACH: CPT

## 2024-07-10 PROCEDURE — 85025 COMPLETE CBC W/AUTO DIFF WBC: CPT

## 2024-07-10 PROCEDURE — 36415 COLL VENOUS BLD VENIPUNCTURE: CPT

## 2024-08-12 ENCOUNTER — HOSPITAL ENCOUNTER (OUTPATIENT)
Dept: HOSPITAL 76 - LAB.N | Age: 69
Discharge: HOME | End: 2024-08-12
Payer: MEDICARE

## 2024-08-12 DIAGNOSIS — N17.9: Primary | ICD-10-CM

## 2024-08-12 LAB
ALBUMIN DIAFP-MCNC: 3.6 G/DL (ref 3.2–5.5)
ALBUMIN/GLOB SERPL: 1.6 {RATIO} (ref 1–2.2)
ALP SERPL-CCNC: 42 IU/L (ref 42–121)
ALT SERPL W P-5'-P-CCNC: 13 IU/L (ref 10–60)
ANION GAP SERPL CALCULATED.4IONS-SCNC: 9 MMOL/L (ref 6–13)
AST SERPL W P-5'-P-CCNC: 13 IU/L (ref 10–42)
BILIRUB BLD-MCNC: 0.4 MG/DL (ref 0.2–1)
BUN SERPL-MCNC: 59 MG/DL (ref 6–20)
CALCIUM UR-MCNC: 8.1 MG/DL (ref 8.5–10.3)
CHLORIDE SERPL-SCNC: 105 MMOL/L (ref 101–111)
CO2 SERPL-SCNC: 26 MMOL/L (ref 21–32)
CREAT SERPLBLD-SCNC: 2.7 MG/DL (ref 0.6–1.3)
GFRSERPLBLD MDRD-ARVRAT: 17 ML/MIN/{1.73_M2} (ref 89–?)
GLOBULIN SER-MCNC: 2.2 G/DL (ref 2.1–4.2)
GLUCOSE SERPL-MCNC: 112 MG/DL (ref 74–104)
POTASSIUM SERPL-SCNC: 4.3 MMOL/L (ref 3.5–4.5)
PROT SPEC-MCNC: 5.8 G/DL (ref 6.4–8.9)
SODIUM SERPLBLD-SCNC: 140 MMOL/L (ref 135–145)

## 2024-08-12 PROCEDURE — 80053 COMPREHEN METABOLIC PANEL: CPT

## 2024-08-12 PROCEDURE — 36415 COLL VENOUS BLD VENIPUNCTURE: CPT

## 2024-08-20 ENCOUNTER — HOSPITAL ENCOUNTER (OUTPATIENT)
Dept: HOSPITAL 76 - LAB.N | Age: 69
Discharge: HOME | End: 2024-08-20
Attending: INTERNAL MEDICINE
Payer: MEDICARE

## 2024-08-20 DIAGNOSIS — I50.32: Primary | ICD-10-CM

## 2024-08-20 LAB
ANION GAP SERPL CALCULATED.4IONS-SCNC: 10 MMOL/L (ref 6–13)
BUN SERPL-MCNC: 65 MG/DL (ref 6–20)
CALCIUM UR-MCNC: 10.4 MG/DL (ref 8.5–10.3)
CHLORIDE SERPL-SCNC: 99 MMOL/L (ref 101–111)
CO2 SERPL-SCNC: 31 MMOL/L (ref 21–32)
CREAT SERPLBLD-SCNC: 3.6 MG/DL (ref 0.6–1.3)
GFRSERPLBLD MDRD-ARVRAT: 12 ML/MIN/{1.73_M2} (ref 89–?)
GLUCOSE SERPL-MCNC: 138 MG/DL (ref 74–104)
POTASSIUM SERPL-SCNC: 4.3 MMOL/L (ref 3.5–4.5)
SODIUM SERPLBLD-SCNC: 140 MMOL/L (ref 135–145)

## 2024-08-20 PROCEDURE — 80048 BASIC METABOLIC PNL TOTAL CA: CPT

## 2024-08-20 PROCEDURE — 36415 COLL VENOUS BLD VENIPUNCTURE: CPT
